# Patient Record
Sex: FEMALE | Race: WHITE | NOT HISPANIC OR LATINO | Employment: FULL TIME | URBAN - NONMETROPOLITAN AREA
[De-identification: names, ages, dates, MRNs, and addresses within clinical notes are randomized per-mention and may not be internally consistent; named-entity substitution may affect disease eponyms.]

---

## 2020-07-18 ENCOUNTER — HISTORICAL (OUTPATIENT)
Dept: ADMINISTRATIVE | Facility: HOSPITAL | Age: 40
End: 2020-07-18

## 2020-07-18 LAB
ALBUMIN SERPL BCP-MCNC: 3.9 G/DL (ref 3.5–5)
ALBUMIN/GLOB SERPL ELPH: 1 {RATIO} (ref 1.5–2.2)
ALP SERPL-CCNC: 84 U/L (ref 45–117)
ALT SERPL W P-5'-P-CCNC: 66 U/L (ref 13–56)
ANION GAP SERPL CALC-SCNC: 6.9 MEQ/L (ref 10–20)
APPEARANCE, UA: NORMAL
AST SERPL-CCNC: 41 U/L (ref 15–37)
B-HCG UR QL: NEGATIVE
BACTERIA SPEC CULT: NORMAL /HPF
BASOPHILS NFR BLD: 0.1 10 (ref 0–0.1)
BASOPHILS NFR BLD: 0.6 % (ref 0–1.5)
BILIRUB SERPL-MCNC: 0.3 MG/DL (ref 0.2–1)
BILIRUB UR QL STRIP: NEGATIVE MG/DL
BUDDING YEAST: NORMAL /HPF
BUN SERPL-MCNC: 13 MG/DL (ref 7–18)
CALCIUM SERPL-MCNC: 9.1 MG/DL (ref 8.5–10.1)
CASTS, URINE MICROSCOPIC: NEGATIVE /LPF
CHLORIDE SERPL-SCNC: 108 MMOL/L (ref 98–107)
CO2 SERPL-SCNC: 27 MMOL/L (ref 22–32)
COLOR UR: YELLOW
CREAT SERPL-MCNC: 0.63 MG/DL (ref 0.55–1.02)
EGFR: 111 ML/MIN/1.73M
EOSINOPHIL NFR BLD: 0 10 (ref 0–0.7)
EOSINOPHIL NFR BLD: 0.4 % (ref 0–7)
EPITHELIAL, URINE MICROSCOPIC: 14 /HPF
ERYTHROCYTE [DISTWIDTH] IN BLOOD BY AUTOMATED COUNT: 14.2 % (ref 11.5–14.5)
GLOBULIN: 4 G/DL (ref 2.3–3.5)
GLUCOSE (UA): NEGATIVE MG/DL
GLUCOSE SERPL-MCNC: 117 MG/DL (ref 70–99)
GRAN #: 7.65 10 (ref 2–7.5)
GRAN%: 0.3 %
GRAN%: 80.5 % (ref 50–80)
HCT VFR BLD AUTO: 39.7 % (ref 37.7–47.9)
HGB BLD-MCNC: 12.9 G/DL (ref 12.2–16.2)
HGB UR QL STRIP: NORMAL ERY/UL
IMMATURE GRANULOCYTES #: 0.03 10
INTERNAL NEG CONTROL: NEGATIVE
INTERNAL POS CONTROL: POSITIVE
KETONES UR QL STRIP: NORMAL MG/DL
LEUKOCYTE ESTERASE UR QL STRIP: NORMAL LEU/UL
LIPASE SERPL-CCNC: 111 U/L (ref 73–393)
LYMPH #: 1.2 10 (ref 1–3.5)
LYMPH%: 13 % (ref 12–50)
MCH RBC QN AUTO: 27.9 PG (ref 27–31)
MCHC RBC AUTO-ENTMCNC: 32.5 G% (ref 32–35)
MCV RBC AUTO: 85.7 FL (ref 80–97)
MONO #: 0.5 10 (ref 0–0.8)
MONO%: 5.2 % (ref 0–12)
MUCOUS, URINE MICROSCOPIC: NORMAL /HPF
NITRITE UR QL STRIP: POSITIVE MG/DL
OSMOC: 277 MOSM/KG (ref 275–295)
PH UR STRIP: 6 [PH] (ref 5–7.5)
PMV BLD AUTO: 10.8 FL (ref 7.4–10.4)
PMV BLD AUTO: 273 10 (ref 142–424)
POTASSIUM SERPL-SCNC: 3.9 MMOL/L (ref 3.5–5.1)
PROT SERPL-MCNC: 7.9 G/DL (ref 6.4–8.2)
PROT UR QL STRIP: NORMAL MG/DL
RBC # BLD AUTO: 4.63 M/UL (ref 4.04–5.48)
RBC #/AREA URNS HPF: NEGATIVE /HPF
SODIUM BLD-SCNC: 138 MMOL/L (ref 136–145)
SP GR UR STRIP: 1.03 (ref 1–1.03)
SPERM, URINE MICROSCOPIC: NORMAL /HPF
TYPE OF SPECIMEN  (UA): NORMAL
UNCLASSIFIED CRYSTALS, UA: NORMAL /HPF
UROBILINOGEN UR STRIP-ACNC: 1 EU/L
WBC # BLD AUTO: 9.5 10 (ref 4–10.2)
WBC #/AREA URNS HPF: 210 /HPF

## 2020-07-20 LAB
URINE CULTURE, ROUTINE: NORMAL

## 2020-08-27 ENCOUNTER — HOSPITAL ENCOUNTER (EMERGENCY)
Facility: HOSPITAL | Age: 40
Discharge: HOME | End: 2020-08-27
Attending: EMERGENCY MEDICINE
Payer: OTHER GOVERNMENT

## 2020-08-27 VITALS
HEIGHT: 63 IN | SYSTOLIC BLOOD PRESSURE: 109 MMHG | OXYGEN SATURATION: 100 % | HEART RATE: 67 BPM | TEMPERATURE: 100 F | WEIGHT: 90 LBS | DIASTOLIC BLOOD PRESSURE: 62 MMHG | RESPIRATION RATE: 18 BRPM | BODY MASS INDEX: 15.95 KG/M2

## 2020-08-27 DIAGNOSIS — G44.209 ACUTE NON INTRACTABLE TENSION-TYPE HEADACHE: Primary | ICD-10-CM

## 2020-08-27 DIAGNOSIS — R07.89 CHEST TIGHTNESS: ICD-10-CM

## 2020-08-27 PROCEDURE — 99284 EMERGENCY DEPT VISIT MOD MDM: CPT | Mod: 25

## 2020-08-27 PROCEDURE — 93005 ELECTROCARDIOGRAM TRACING: CPT

## 2020-08-27 PROCEDURE — 93010 EKG 12-LEAD: ICD-10-PCS | Mod: ,,, | Performed by: INTERNAL MEDICINE

## 2020-08-27 PROCEDURE — 96372 THER/PROPH/DIAG INJ SC/IM: CPT

## 2020-08-27 PROCEDURE — 93010 ELECTROCARDIOGRAM REPORT: CPT | Mod: ,,, | Performed by: INTERNAL MEDICINE

## 2020-08-27 PROCEDURE — 63600175 PHARM REV CODE 636 W HCPCS: Performed by: EMERGENCY MEDICINE

## 2020-08-27 RX ORDER — BUTALBITAL, ACETAMINOPHEN AND CAFFEINE 50; 325; 40 MG/1; MG/1; MG/1
1 TABLET ORAL EVERY 4 HOURS PRN
Qty: 16 TABLET | Refills: 0 | Status: SHIPPED | OUTPATIENT
Start: 2020-08-27 | End: 2020-08-30

## 2020-08-27 RX ORDER — KETOROLAC TROMETHAMINE 30 MG/ML
30 INJECTION, SOLUTION INTRAMUSCULAR; INTRAVENOUS
Status: COMPLETED | OUTPATIENT
Start: 2020-08-27 | End: 2020-08-27

## 2020-08-27 RX ORDER — DIPHENHYDRAMINE HYDROCHLORIDE 50 MG/ML
50 INJECTION INTRAMUSCULAR; INTRAVENOUS
Status: COMPLETED | OUTPATIENT
Start: 2020-08-27 | End: 2020-08-27

## 2020-08-27 RX ORDER — PROCHLORPERAZINE EDISYLATE 5 MG/ML
10 INJECTION INTRAMUSCULAR; INTRAVENOUS
Status: COMPLETED | OUTPATIENT
Start: 2020-08-27 | End: 2020-08-27

## 2020-08-27 RX ADMIN — PROCHLORPERAZINE EDISYLATE 10 MG: 5 INJECTION INTRAMUSCULAR; INTRAVENOUS at 10:08

## 2020-08-27 RX ADMIN — DIPHENHYDRAMINE HYDROCHLORIDE 50 MG: 50 INJECTION, SOLUTION INTRAMUSCULAR; INTRAVENOUS at 10:08

## 2020-08-27 RX ADMIN — KETOROLAC TROMETHAMINE 30 MG: 30 INJECTION, SOLUTION INTRAMUSCULAR at 10:08

## 2020-08-28 NOTE — ED NOTES
"NEUROLOGICAL:   Patient is awake , alert  and oriented x 4 . Pupils are PERRL. Gait is steady.   Moves all extremities without difficulty.   Patient reports weakness and headache.  GCS 15    HEENT:   Head appears normocephalic  and symmetric .   Eyes appear WNL to both eyes. Patient reports "weakness in eyes" to both eyes .   Ears appear WNL. Patient reports no complaints  to both ears.   Nares appear patent . Patient reports no nose complaints .  Mouth appears moist and pink. Patient reports no mouth complaints.   Throat appears pink and moist . Patient reports no throat complaints.    CARDIOVASCULAR:   S1 and S2 present, no murmurs, gallops, or rubs, rate regular  and pulses palpable (2+)    On palpation no edema noted , noted to none.   Patient reports no CV complaints.  .   Patient vitals are WNL.    RESPIRATORY:   Airway Clear, Open, and Patent.  Respirations are even and unlabored.   Breath sounds clear  to all lung fields.   Patient reports no respiratory complaints.     GASTROINTESTINAL:   Abdomen is soft  and non-tender x 4 quadrants. Bowel sounds are normoactive to all quadrants .   Patient reports nausea.   Patient reports having diarrhea last night.     GENITOURINARY:   Patient reports no  complaints.     MUSCULOSKELETAL:   full range of motion to all extremities, no swelling noted , no tenderness noted and no weakness noted.   Patient reports no musculoskeletal complaints     SKIN:   Skin appears warm , dry , good turgor, color normal for race and intact. Patient reports no skin complaints.     Patient reports "I am just not feeling myself. It started about 11am today and I just been fighting it."  "

## 2020-08-28 NOTE — ED PROVIDER NOTES
"Encounter Date: 2020       History     Chief Complaint   Patient presents with    Nausea     Pt reports "not feeling right" all day. Pt reports headache and nausea.     Headache     3-year-old female presents with a headache.  States the pain is in her forehead and constant since this morning.  She also complains of a strange feeling in her chest and nausea.  She denies shortness of breath, fever, sore throat, or anosmia.  She denies taking any over-the-counter medications for the headache today.  She states that she has had multiple headaches like this in the past        Review of patient's allergies indicates:  No Known Allergies  History reviewed. No pertinent past medical history.  Past Surgical History:   Procedure Laterality Date     SECTION       History reviewed. No pertinent family history.  Social History     Tobacco Use    Smoking status: Current Every Day Smoker     Packs/day: 1.00     Types: Cigarettes    Smokeless tobacco: Never Used   Substance Use Topics    Alcohol use: Not Currently    Drug use: Yes     Types: Marijuana     Review of Systems   Respiratory: Positive for chest tightness.    Gastrointestinal: Positive for nausea.   Neurological: Positive for headaches.   All other systems reviewed and are negative.      Physical Exam     Initial Vitals [20 2210]   BP Pulse Resp Temp SpO2   105/72 65 18 99.7 °F (37.6 °C) 99 %      MAP       --         Physical Exam    Nursing note and vitals reviewed.  Constitutional: She appears well-developed and well-nourished.   HENT:   Head: Atraumatic.   Mouth/Throat: Oropharynx is clear and moist.   Cardiovascular: Normal rate, regular rhythm and normal heart sounds.   Pulmonary/Chest: Breath sounds normal. No respiratory distress.   Abdominal: Soft. There is no abdominal tenderness.   Neurological: She is alert and oriented to person, place, and time.   Skin: Skin is warm and dry.         ED Course   Procedures  Labs Reviewed - No data " to display  EKG Readings: (Independently Interpreted)   Rhythm: Normal Sinus Rhythm. Ectopy: No Ectopy. Conduction: Normal. ST Segments: Normal ST Segments. T Waves: Normal. Clinical Impression: Normal Sinus Rhythm       Imaging Results    None          Medical Decision Making:   ED Management:  Patient feeling better after medication                                 Clinical Impression:       ICD-10-CM ICD-9-CM   1. Acute non intractable tension-type headache  G44.209 339.10   2. Chest tightness  R07.89 786.59             ED Disposition Condition    Discharge Stable        ED Prescriptions     Medication Sig Dispense Start Date End Date Auth. Provider    butalbital-acetaminophen-caffeine -40 mg (FIORICET, ESGIC) -40 mg per tablet Take 1 tablet by mouth every 4 (four) hours as needed for Pain. 16 tablet 8/27/2020 8/30/2020 Jeffery Topete MD        Follow-up Information     Follow up With Specialties Details Why Contact Info    [facility] Psychology, Internal Medicine, Gynecology, Dental General Practice In 2 days If symptoms worsen [address] [facility] [address]  [phone]                                       Jeffery Topete MD  08/27/20 3194       Jeffery Topete MD  08/27/20 1216

## 2020-09-05 ENCOUNTER — HOSPITAL ENCOUNTER (EMERGENCY)
Facility: HOSPITAL | Age: 40
Discharge: HOME | End: 2020-09-06
Attending: EMERGENCY MEDICINE
Payer: OTHER GOVERNMENT

## 2020-09-05 DIAGNOSIS — F41.9 ANXIETY: Primary | ICD-10-CM

## 2020-09-05 PROCEDURE — 99284 EMERGENCY DEPT VISIT MOD MDM: CPT | Mod: 25

## 2020-09-05 PROCEDURE — 63600175 PHARM REV CODE 636 W HCPCS: Performed by: EMERGENCY MEDICINE

## 2020-09-05 PROCEDURE — 96374 THER/PROPH/DIAG INJ IV PUSH: CPT

## 2020-09-05 RX ORDER — LORAZEPAM 2 MG/ML
1 INJECTION INTRAMUSCULAR ONCE
Status: COMPLETED | OUTPATIENT
Start: 2020-09-05 | End: 2020-09-05

## 2020-09-05 RX ADMIN — LORAZEPAM 1 MG: 2 INJECTION INTRAMUSCULAR; INTRAVENOUS at 11:09

## 2020-09-06 VITALS
SYSTOLIC BLOOD PRESSURE: 119 MMHG | RESPIRATION RATE: 16 BRPM | HEIGHT: 63 IN | WEIGHT: 86 LBS | BODY MASS INDEX: 15.24 KG/M2 | OXYGEN SATURATION: 99 % | HEART RATE: 71 BPM | TEMPERATURE: 99 F | DIASTOLIC BLOOD PRESSURE: 71 MMHG

## 2020-09-06 RX ORDER — HYDROXYZINE PAMOATE 50 MG/1
50 CAPSULE ORAL EVERY 6 HOURS PRN
Qty: 20 CAPSULE | Refills: 0 | Status: SHIPPED | OUTPATIENT
Start: 2020-09-06 | End: 2021-02-07

## 2020-09-06 NOTE — ED PROVIDER NOTES
Encounter Date: 2020       History     Chief Complaint   Patient presents with    Anxiety     Anxiety attack for a couple of days     Patient is a 39-year-old female, with past medical history of depression and anxiety, presents to the ER for anxiety and a panic attack.  She states that over the last several days she has had increased stress, because her some has been more difficult to deal with.  Should be noted that the patient some is developmentally delayed, and the mother is the primary caretaker.  Patient admits to previously being on medicines to help with depression and anxiety, and states that she has been to Behavioral Health because of previous identical episodes.  The patient denies any auditory hallucinations or visual hallucinations, suicidal, homicidal ideation.  She is tearful during my exam, discharge the she just wants to try to relax.  She denies any recent illness, fevers, chills, chest pain, shortness of breath, abdominal pain, nausea, vomiting or any other acute active symptoms    Review of patient's allergies indicates:  No Known Allergies  Past Medical History:   Diagnosis Date    Anxiety      Past Surgical History:   Procedure Laterality Date     SECTION       History reviewed. No pertinent family history.  Social History     Tobacco Use    Smoking status: Current Every Day Smoker     Packs/day: 1.00     Types: Cigarettes    Smokeless tobacco: Never Used   Substance Use Topics    Alcohol use: Not Currently    Drug use: Yes     Types: Marijuana     Review of Systems   All other systems reviewed and are negative.      Physical Exam     Initial Vitals [20 2238]   BP Pulse Resp Temp SpO2   125/79 75 18 98.9 °F (37.2 °C) 99 %      MAP       --         Physical Exam    Nursing note and vitals reviewed.  Constitutional: She appears well-developed and well-nourished.   HENT:   Head: Normocephalic and atraumatic.   Eyes: Conjunctivae and EOM are normal. Pupils are equal,  round, and reactive to light.   Neck: Normal range of motion. Neck supple. No tracheal deviation present.   Cardiovascular: Normal rate, regular rhythm, normal heart sounds and intact distal pulses.   Pulmonary/Chest: Breath sounds normal. No respiratory distress. She has no wheezes. She has no rhonchi. She has no rales. She exhibits no tenderness.   Abdominal: Soft. Bowel sounds are normal. She exhibits no distension and no mass. There is no abdominal tenderness. There is no rebound and no guarding.   Musculoskeletal: Normal range of motion. No tenderness or edema.   Neurological: She is alert and oriented to person, place, and time. She has normal strength and normal reflexes. She displays normal reflexes. No cranial nerve deficit or sensory deficit.   Skin: Skin is warm and dry. Capillary refill takes less than 2 seconds.   Psychiatric: She has a normal mood and affect. Her behavior is normal. Judgment and thought content normal.   Patient is tearful but her thought process is appropriate.         ED Course   Procedures  Labs Reviewed - No data to display       Imaging Results    None          Medical Decision Making:   ED Management:  Patient refused an EKG, chest x-ray, and lab work because of her initial complaint of chest discomfort.  She feeling stated that she was having issues with anxiety and panic, and stated that the other symptoms were simply part of her panic affected episodes.  The patient states that she is feeling much better after receiving medicine, and is being discharged home.  She was given instructions to follow-up with her primary care physician/clinic and behavior Health for further assessment evaluation.  The patient was also told to return to the ER for any acute negative worsening symptoms verbalized understanding of this Medical point.                                 Clinical Impression:       ICD-10-CM ICD-9-CM   1. Anxiety  F41.9 300.00             ED Disposition Condition     Discharge Stable        ED Prescriptions     Medication Sig Dispense Start Date End Date Auth. Provider    hydrOXYzine pamoate (VISTARIL) 50 MG Cap Take 1 capsule (50 mg total) by mouth every 6 (six) hours as needed (anxiety). 20 capsule 9/6/2020  Trenton Marte MD        Follow-up Information     Follow up With Specialties Details Why Contact Info    [facility] Psychology, Internal Medicine, Gynecology, Dental General Practice Schedule an appointment as soon as possible for a visit   [address] [place] [address]  [phone]                                       Trenton Marte MD  09/06/20 0018

## 2020-10-07 ENCOUNTER — HOSPITAL ENCOUNTER (EMERGENCY)
Facility: HOSPITAL | Age: 40
Discharge: HOME | End: 2020-10-07
Attending: EMERGENCY MEDICINE
Payer: MEDICAID

## 2020-10-07 VITALS
TEMPERATURE: 98 F | RESPIRATION RATE: 18 BRPM | OXYGEN SATURATION: 98 % | BODY MASS INDEX: 15.59 KG/M2 | WEIGHT: 88 LBS | HEART RATE: 76 BPM | HEIGHT: 63 IN | DIASTOLIC BLOOD PRESSURE: 70 MMHG | SYSTOLIC BLOOD PRESSURE: 114 MMHG

## 2020-10-07 DIAGNOSIS — R63.0 APPETITE LOSS: ICD-10-CM

## 2020-10-07 DIAGNOSIS — F19.10 SUBSTANCE ABUSE: ICD-10-CM

## 2020-10-07 DIAGNOSIS — N39.0 URINARY TRACT INFECTION WITHOUT HEMATURIA, SITE UNSPECIFIED: Primary | ICD-10-CM

## 2020-10-07 LAB
ALBUMIN SERPL BCP-MCNC: 3.4 G/DL (ref 3.5–5.2)
ALP SERPL-CCNC: 79 U/L (ref 55–135)
ALT SERPL W/O P-5'-P-CCNC: 34 U/L (ref 10–44)
AMPHET+METHAMPHET UR QL: NORMAL
ANION GAP SERPL CALC-SCNC: 4 MMOL/L (ref 8–16)
AST SERPL-CCNC: 20 U/L (ref 10–40)
B-HCG UR QL: NEGATIVE
BACTERIA #/AREA URNS HPF: ABNORMAL /HPF
BARBITURATES UR QL SCN>200 NG/ML: NEGATIVE
BASOPHILS # BLD AUTO: 0.08 K/UL (ref 0–0.2)
BASOPHILS NFR BLD: 1 % (ref 0–1.9)
BENZODIAZ UR QL SCN>200 NG/ML: NEGATIVE
BILIRUB SERPL-MCNC: 0.3 MG/DL (ref 0.1–1)
BILIRUB UR QL STRIP: NEGATIVE
BUN SERPL-MCNC: 8 MG/DL (ref 6–20)
BZE UR QL SCN: NEGATIVE
CALCIUM SERPL-MCNC: 9 MG/DL (ref 8.7–10.5)
CANNABINOIDS UR QL SCN: NORMAL
CHLORIDE SERPL-SCNC: 108 MMOL/L (ref 95–110)
CLARITY UR: ABNORMAL
CO2 SERPL-SCNC: 28 MMOL/L (ref 23–29)
COLOR UR: YELLOW
CREAT SERPL-MCNC: 0.7 MG/DL (ref 0.5–1.4)
CREAT UR-MCNC: 98.7 MG/DL (ref 15–325)
DIFFERENTIAL METHOD: NORMAL
EOSINOPHIL # BLD AUTO: 0.2 K/UL (ref 0–0.5)
EOSINOPHIL NFR BLD: 2.3 % (ref 0–8)
EPITH CASTS #/AREA URNS LPF: ABNORMAL /LPF
ERYTHROCYTE [DISTWIDTH] IN BLOOD BY AUTOMATED COUNT: 14.1 % (ref 11.5–14.5)
EST. GFR  (AFRICAN AMERICAN): >60 ML/MIN/1.73 M^2
EST. GFR  (NON AFRICAN AMERICAN): >60 ML/MIN/1.73 M^2
ETHANOL SERPL-MCNC: <3 MG/DL
GLUCOSE SERPL-MCNC: 94 MG/DL (ref 70–110)
GLUCOSE UR QL STRIP: NEGATIVE
HCT VFR BLD AUTO: 40.3 % (ref 37–48.5)
HGB BLD-MCNC: 13.3 G/DL (ref 12–16)
HGB UR QL STRIP: ABNORMAL
HYALINE CASTS #/AREA URNS LPF: 0 /LPF
IMM GRANULOCYTES # BLD AUTO: 0.01 K/UL (ref 0–0.04)
IMM GRANULOCYTES NFR BLD AUTO: 0.1 % (ref 0–0.5)
KETONES UR QL STRIP: NEGATIVE
LEUKOCYTE ESTERASE UR QL STRIP: ABNORMAL
LIPASE SERPL-CCNC: 135 U/L (ref 23–300)
LYMPHOCYTES # BLD AUTO: 1.9 K/UL (ref 1–4.8)
LYMPHOCYTES NFR BLD: 25.2 % (ref 18–48)
MCH RBC QN AUTO: 28.7 PG (ref 27–31)
MCHC RBC AUTO-ENTMCNC: 33 G/DL (ref 32–36)
MCV RBC AUTO: 87 FL (ref 82–98)
METHADONE UR QL SCN>300 NG/ML: NEGATIVE
MICROSCOPIC COMMENT: ABNORMAL
MONOCYTES # BLD AUTO: 0.7 K/UL (ref 0.3–1)
MONOCYTES NFR BLD: 8.5 % (ref 4–15)
NEUTROPHILS # BLD AUTO: 4.8 K/UL (ref 1.8–7.7)
NEUTROPHILS NFR BLD: 62.9 % (ref 38–73)
NITRITE UR QL STRIP: POSITIVE
NRBC BLD-RTO: 0 /100 WBC
OPIATES UR QL SCN: NEGATIVE
PCP UR QL SCN>25 NG/ML: NEGATIVE
PH UR STRIP: 7 [PH] (ref 5–8)
PLATELET # BLD AUTO: 256 K/UL (ref 150–350)
PMV BLD AUTO: 11.5 FL (ref 9.2–12.9)
POTASSIUM SERPL-SCNC: 4.1 MMOL/L (ref 3.5–5.1)
PROT SERPL-MCNC: 7.2 G/DL (ref 6–8.4)
PROT UR QL STRIP: NEGATIVE
RBC # BLD AUTO: 4.64 M/UL (ref 4–5.4)
RBC #/AREA URNS HPF: 1 /HPF (ref 0–4)
SARS-COV-2 RDRP RESP QL NAA+PROBE: NEGATIVE
SODIUM SERPL-SCNC: 140 MMOL/L (ref 136–145)
SP GR UR STRIP: 1.02 (ref 1–1.03)
SQUAMOUS #/AREA URNS HPF: 6 /HPF
TOXICOLOGY INFORMATION: NORMAL
URN SPEC COLLECT METH UR: ABNORMAL
UROBILINOGEN UR STRIP-ACNC: 1 EU/DL
WBC # BLD AUTO: 7.66 K/UL (ref 3.9–12.7)
WBC #/AREA URNS HPF: 87 /HPF (ref 0–5)

## 2020-10-07 PROCEDURE — 87077 CULTURE AEROBIC IDENTIFY: CPT

## 2020-10-07 PROCEDURE — 87186 SC STD MICRODIL/AGAR DIL: CPT

## 2020-10-07 PROCEDURE — 96374 THER/PROPH/DIAG INJ IV PUSH: CPT

## 2020-10-07 PROCEDURE — 83690 ASSAY OF LIPASE: CPT

## 2020-10-07 PROCEDURE — 80320 DRUG SCREEN QUANTALCOHOLS: CPT

## 2020-10-07 PROCEDURE — 96361 HYDRATE IV INFUSION ADD-ON: CPT

## 2020-10-07 PROCEDURE — 99284 EMERGENCY DEPT VISIT MOD MDM: CPT | Mod: 25

## 2020-10-07 PROCEDURE — 80053 COMPREHEN METABOLIC PANEL: CPT

## 2020-10-07 PROCEDURE — 63600175 PHARM REV CODE 636 W HCPCS: Performed by: CLINICAL NURSE SPECIALIST

## 2020-10-07 PROCEDURE — 85025 COMPLETE CBC W/AUTO DIFF WBC: CPT

## 2020-10-07 PROCEDURE — 87088 URINE BACTERIA CULTURE: CPT

## 2020-10-07 PROCEDURE — 81000 URINALYSIS NONAUTO W/SCOPE: CPT | Mod: 59

## 2020-10-07 PROCEDURE — 36415 COLL VENOUS BLD VENIPUNCTURE: CPT

## 2020-10-07 PROCEDURE — 81025 URINE PREGNANCY TEST: CPT

## 2020-10-07 PROCEDURE — 25000003 PHARM REV CODE 250: Performed by: CLINICAL NURSE SPECIALIST

## 2020-10-07 PROCEDURE — 80307 DRUG TEST PRSMV CHEM ANLYZR: CPT

## 2020-10-07 PROCEDURE — 87086 URINE CULTURE/COLONY COUNT: CPT

## 2020-10-07 PROCEDURE — U0002 COVID-19 LAB TEST NON-CDC: HCPCS

## 2020-10-07 RX ORDER — CYPROHEPTADINE HYDROCHLORIDE 4 MG/1
4 TABLET ORAL 3 TIMES DAILY PRN
Qty: 21 TABLET | Refills: 0 | Status: SHIPPED | OUTPATIENT
Start: 2020-10-07 | End: 2021-02-07

## 2020-10-07 RX ORDER — ONDANSETRON 2 MG/ML
4 INJECTION INTRAMUSCULAR; INTRAVENOUS
Status: COMPLETED | OUTPATIENT
Start: 2020-10-07 | End: 2020-10-07

## 2020-10-07 RX ORDER — NITROFURANTOIN 25; 75 MG/1; MG/1
100 CAPSULE ORAL 2 TIMES DAILY
Qty: 10 CAPSULE | Refills: 0 | Status: SHIPPED | OUTPATIENT
Start: 2020-10-07 | End: 2020-10-12

## 2020-10-07 RX ADMIN — SODIUM CHLORIDE 1000 ML: 0.9 INJECTION, SOLUTION INTRAVENOUS at 04:10

## 2020-10-07 RX ADMIN — ONDANSETRON 4 MG: 2 INJECTION INTRAMUSCULAR; INTRAVENOUS at 04:10

## 2020-10-07 NOTE — Clinical Note
"Anna "Anna" Collette was seen and treated in our emergency department on 10/7/2020.  She may return to work on 10/08/2020.       If you have any questions or concerns, please don't hesitate to call.      KYRIE Gauthier     "

## 2020-10-07 NOTE — Clinical Note
"Anna "Anna" Collette was seen and treated in our emergency department on 10/7/2020.  She may return to work on 10/09/2020.       If you have any questions or concerns, please don't hesitate to call.      KYRIE Gauthier     "

## 2020-10-07 NOTE — ED PROVIDER NOTES
"Encounter Date: 10/7/2020       History     Chief Complaint   Patient presents with    Fatigue     Patient to the ER with complaints of weakness, malaise, vomiting, body aches, and decreased urination onset yesterday    Emesis     40-YEAR-OLD FEMALE PRESENTS TO THE ER WITH WEAKNESS, MALAISE, VOMITING, BODY ACHES, DECREASED URINATION SINCE YESTERDAY.  PATIENT ALSO STATES/IF SHE WAS WEIGHING 115, NOW SHE IS DOWN TO 88 LB.  HAS NOT SEEN A DOCTOR FOR THIS ISSUE YET.  PATIENT HAS AT APPOINTMENT TOMORROW FOR LAB WORK.  DUE FOR MAMMOGRAM.  MOM HAS A HISTORY OF BREAST CANCER.  DENIES ANY CROHN'S, COLITIS, URINARY ISSUES        Review of patient's allergies indicates:  No Known Allergies  Past Medical History:   Diagnosis Date    Anxiety     Arrhythmia     "They say my heart skips a beat"     Past Surgical History:   Procedure Laterality Date     SECTION       Family History   Problem Relation Age of Onset    Cancer Mother      Social History     Tobacco Use    Smoking status: Current Every Day Smoker     Packs/day: 1.00     Types: Cigarettes    Smokeless tobacco: Never Used   Substance Use Topics    Alcohol use: Not Currently    Drug use: Yes     Types: Marijuana     Review of Systems   Constitutional: Positive for activity change, appetite change, fatigue and unexpected weight change. Negative for fever.   HENT: Negative for sore throat.    Respiratory: Negative for shortness of breath.    Cardiovascular: Negative for chest pain.   Gastrointestinal: Positive for abdominal pain, nausea and vomiting.   Genitourinary: Positive for difficulty urinating. Negative for dysuria.   Musculoskeletal: Negative for back pain.   Skin: Negative for rash.   Neurological: Negative for weakness.   Hematological: Does not bruise/bleed easily.   All other systems reviewed and are negative.      Physical Exam     Initial Vitals [10/07/20 1608]   BP Pulse Resp Temp SpO2   114/70 76 18 98.1 °F (36.7 °C) 98 %      MAP       --   "       Physical Exam    Nursing note and vitals reviewed.  Constitutional: Vital signs are normal. She appears well-developed and well-nourished. She has a sickly appearance. She appears ill.   HENT:   Head: Normocephalic and atraumatic.   Eyes: Pupils are equal, round, and reactive to light.   Neck: Normal range of motion.   Cardiovascular: Normal rate and regular rhythm.   Pulmonary/Chest: Breath sounds normal.   Abdominal: Soft. Bowel sounds are decreased. There is generalized abdominal tenderness. There is guarding.   Musculoskeletal: Normal range of motion.   Neurological: She is alert and oriented to person, place, and time.   Skin: Skin is warm and dry.   Psychiatric: Her affect is labile. She is withdrawn. Cognition and memory are normal.         ED Course   Procedures  Labs Reviewed   COMPREHENSIVE METABOLIC PANEL - Abnormal; Notable for the following components:       Result Value    Albumin 3.4 (*)     Anion Gap 4 (*)     All other components within normal limits   URINALYSIS, REFLEX TO URINE CULTURE - Abnormal; Notable for the following components:    Appearance, UA Cloudy (*)     Occult Blood UA Trace (*)     Nitrite, UA Positive (*)     Leukocytes, UA 3+ (*)     All other components within normal limits    Narrative:     Specimen Source->Urine   URINALYSIS MICROSCOPIC - Abnormal; Notable for the following components:    WBC, UA 87 (*)     Bacteria Many (*)     All other components within normal limits    Narrative:     Specimen Source->Urine   CULTURE, URINE   CBC W/ AUTO DIFFERENTIAL   LIPASE   PREGNANCY TEST, URINE RAPID    Narrative:     Specimen Source->Urine   SARS-COV-2 RNA AMPLIFICATION, QUAL   DRUG SCREEN PANEL, URINE EMERGENCY    Narrative:     Specimen Source->Urine   ALCOHOL,MEDICAL (ETHANOL)          Imaging Results          CT Abdomen Pelvis  Without Contrast (Final result)  Result time 10/07/20 17:38:43    Final result by Nghia Eckert MD (10/07/20 17:38:43)                 Impression:       No CT evidence of acute intra-abdominal pathology on this exam.  Study is somewhat limited by lack of contrast and intra-abdominal fat.      Electronically signed by: Nghia Eckert MD  Date:    10/07/2020  Time:    17:38             Narrative:    EXAMINATION:  CT ABDOMEN PELVIS WITHOUT CONTRAST    CLINICAL HISTORY:  Abdominal pain, acute, nonlocalized;WEIGHT LOSS;    TECHNIQUE:  Iterative reconstruction technique was used.    CT/cardiac nuclear exam/s in prior 12 months:  0.    COMPARISON:  None.    FINDINGS:  Detail is somewhat limited by lack of IV contrast and intra-abdominal fat.  The liver, spleen, gallbladder have an unremarkable noncontrast appearance.  The pancreas and adrenal glands are unremarkable.  No stones are seen within either kidney, and there is no hydronephrosis.  No bowel obstruction.  Pelvic viscera appear unremarkable.  No free fluid.                                 Medical Decision Making:   Clinical Tests:   Lab Tests: Ordered and Reviewed  Radiological Study: Ordered and Reviewed                             Clinical Impression:     ICD-10-CM ICD-9-CM   1. Urinary tract infection without hematuria, site unspecified  N39.0 599.0   2. Substance abuse  F19.10 305.90                          ED Disposition Condition    Discharge Stable        ED Prescriptions     Medication Sig Dispense Start Date End Date Auth. Provider    cyproheptadine (PERIACTIN) 4 mg tablet Take 1 tablet (4 mg total) by mouth 3 (three) times daily as needed. 21 tablet 10/7/2020  Mone Fraah NP        Follow-up Information    None                                      Mone Farah NP  10/07/20 1934       Mone Farah NP  10/07/20 1942

## 2020-10-10 LAB — BACTERIA UR CULT: ABNORMAL

## 2020-12-23 ENCOUNTER — HOSPITAL ENCOUNTER (EMERGENCY)
Facility: HOSPITAL | Age: 40
Discharge: HOME | End: 2020-12-23
Attending: EMERGENCY MEDICINE
Payer: OTHER GOVERNMENT

## 2020-12-23 VITALS
SYSTOLIC BLOOD PRESSURE: 91 MMHG | RESPIRATION RATE: 16 BRPM | TEMPERATURE: 98 F | DIASTOLIC BLOOD PRESSURE: 58 MMHG | OXYGEN SATURATION: 100 % | HEIGHT: 63 IN | BODY MASS INDEX: 14.18 KG/M2 | HEART RATE: 54 BPM | WEIGHT: 80 LBS

## 2020-12-23 DIAGNOSIS — R11.10 VOMITING: ICD-10-CM

## 2020-12-23 DIAGNOSIS — R11.2 NON-INTRACTABLE VOMITING WITH NAUSEA, UNSPECIFIED VOMITING TYPE: Primary | ICD-10-CM

## 2020-12-23 LAB
ALBUMIN SERPL BCP-MCNC: 3.6 G/DL (ref 3.5–5.2)
ALP SERPL-CCNC: 98 U/L (ref 55–135)
ALT SERPL W/O P-5'-P-CCNC: 90 U/L (ref 10–44)
ANION GAP SERPL CALC-SCNC: 3 MMOL/L (ref 8–16)
AST SERPL-CCNC: 25 U/L (ref 10–40)
BASOPHILS # BLD AUTO: 0.09 K/UL (ref 0–0.2)
BASOPHILS NFR BLD: 1.2 % (ref 0–1.9)
BILIRUB SERPL-MCNC: 0.5 MG/DL (ref 0.1–1)
BUN SERPL-MCNC: 11 MG/DL (ref 6–20)
CALCIUM SERPL-MCNC: 8.6 MG/DL (ref 8.7–10.5)
CHLORIDE SERPL-SCNC: 110 MMOL/L (ref 95–110)
CO2 SERPL-SCNC: 28 MMOL/L (ref 23–29)
CREAT SERPL-MCNC: 0.7 MG/DL (ref 0.5–1.4)
CTP QC/QA: YES
CTP QC/QA: YES
DIFFERENTIAL METHOD: ABNORMAL
EOSINOPHIL # BLD AUTO: 0.1 K/UL (ref 0–0.5)
EOSINOPHIL NFR BLD: 0.9 % (ref 0–8)
ERYTHROCYTE [DISTWIDTH] IN BLOOD BY AUTOMATED COUNT: 14.6 % (ref 11.5–14.5)
EST. GFR  (AFRICAN AMERICAN): >60 ML/MIN/1.73 M^2
EST. GFR  (NON AFRICAN AMERICAN): >60 ML/MIN/1.73 M^2
GLUCOSE SERPL-MCNC: 96 MG/DL (ref 70–110)
HCG INTACT+B SERPL-ACNC: <1 MIU/ML
HCT VFR BLD AUTO: 39.5 % (ref 37–48.5)
HGB BLD-MCNC: 12.7 G/DL (ref 12–16)
IMM GRANULOCYTES # BLD AUTO: 0.02 K/UL (ref 0–0.04)
IMM GRANULOCYTES NFR BLD AUTO: 0.3 % (ref 0–0.5)
LIPASE SERPL-CCNC: 100 U/L (ref 23–300)
LYMPHOCYTES # BLD AUTO: 1.3 K/UL (ref 1–4.8)
LYMPHOCYTES NFR BLD: 16.6 % (ref 18–48)
MCH RBC QN AUTO: 28.5 PG (ref 27–31)
MCHC RBC AUTO-ENTMCNC: 32.2 G/DL (ref 32–36)
MCV RBC AUTO: 89 FL (ref 82–98)
MONOCYTES # BLD AUTO: 0.5 K/UL (ref 0.3–1)
MONOCYTES NFR BLD: 6.6 % (ref 4–15)
NEUTROPHILS # BLD AUTO: 5.6 K/UL (ref 1.8–7.7)
NEUTROPHILS NFR BLD: 74.4 % (ref 38–73)
NRBC BLD-RTO: 0 /100 WBC
PLATELET # BLD AUTO: 288 K/UL (ref 150–350)
PMV BLD AUTO: 10.6 FL (ref 9.2–12.9)
POC MOLECULAR INFLUENZA A AGN: NEGATIVE
POC MOLECULAR INFLUENZA B AGN: NEGATIVE
POTASSIUM SERPL-SCNC: 4.4 MMOL/L (ref 3.5–5.1)
PROT SERPL-MCNC: 7.3 G/DL (ref 6–8.4)
RBC # BLD AUTO: 4.46 M/UL (ref 4–5.4)
SARS-COV-2 RDRP RESP QL NAA+PROBE: NEGATIVE
SODIUM SERPL-SCNC: 141 MMOL/L (ref 136–145)
WBC # BLD AUTO: 7.58 K/UL (ref 3.9–12.7)

## 2020-12-23 PROCEDURE — 25000003 PHARM REV CODE 250: Performed by: EMERGENCY MEDICINE

## 2020-12-23 PROCEDURE — 63600175 PHARM REV CODE 636 W HCPCS: Performed by: EMERGENCY MEDICINE

## 2020-12-23 PROCEDURE — 84702 CHORIONIC GONADOTROPIN TEST: CPT

## 2020-12-23 PROCEDURE — 85025 COMPLETE CBC W/AUTO DIFF WBC: CPT

## 2020-12-23 PROCEDURE — 96375 TX/PRO/DX INJ NEW DRUG ADDON: CPT

## 2020-12-23 PROCEDURE — U0002 COVID-19 LAB TEST NON-CDC: HCPCS | Performed by: EMERGENCY MEDICINE

## 2020-12-23 PROCEDURE — 36415 COLL VENOUS BLD VENIPUNCTURE: CPT

## 2020-12-23 PROCEDURE — 83690 ASSAY OF LIPASE: CPT

## 2020-12-23 PROCEDURE — 80053 COMPREHEN METABOLIC PANEL: CPT

## 2020-12-23 PROCEDURE — 99284 EMERGENCY DEPT VISIT MOD MDM: CPT | Mod: 25

## 2020-12-23 PROCEDURE — 96365 THER/PROPH/DIAG IV INF INIT: CPT

## 2020-12-23 RX ORDER — PROMETHAZINE HYDROCHLORIDE 25 MG/ML
25 INJECTION, SOLUTION INTRAMUSCULAR; INTRAVENOUS
Status: DISCONTINUED | OUTPATIENT
Start: 2020-12-23 | End: 2020-12-23

## 2020-12-23 RX ORDER — ONDANSETRON 2 MG/ML
4 INJECTION INTRAMUSCULAR; INTRAVENOUS
Status: COMPLETED | OUTPATIENT
Start: 2020-12-23 | End: 2020-12-23

## 2020-12-23 RX ORDER — MORPHINE SULFATE 4 MG/ML
4 INJECTION, SOLUTION INTRAMUSCULAR; INTRAVENOUS
Status: COMPLETED | OUTPATIENT
Start: 2020-12-23 | End: 2020-12-23

## 2020-12-23 RX ORDER — DIPHENHYDRAMINE HYDROCHLORIDE 50 MG/ML
12.5 INJECTION INTRAMUSCULAR; INTRAVENOUS
Status: COMPLETED | OUTPATIENT
Start: 2020-12-23 | End: 2020-12-23

## 2020-12-23 RX ORDER — PROMETHAZINE HYDROCHLORIDE 25 MG/1
25 SUPPOSITORY RECTAL EVERY 6 HOURS PRN
Qty: 10 SUPPOSITORY | Refills: 0 | Status: SHIPPED | OUTPATIENT
Start: 2020-12-23 | End: 2021-02-07

## 2020-12-23 RX ORDER — ONDANSETRON 4 MG/1
4 TABLET, ORALLY DISINTEGRATING ORAL EVERY 6 HOURS PRN
Qty: 12 TABLET | Refills: 0 | Status: SHIPPED | OUTPATIENT
Start: 2020-12-23 | End: 2021-02-07

## 2020-12-23 RX ORDER — METOCLOPRAMIDE HYDROCHLORIDE 5 MG/ML
5 INJECTION INTRAMUSCULAR; INTRAVENOUS
Status: COMPLETED | OUTPATIENT
Start: 2020-12-23 | End: 2020-12-23

## 2020-12-23 RX ADMIN — METOCLOPRAMIDE 5 MG: 5 INJECTION, SOLUTION INTRAMUSCULAR; INTRAVENOUS at 07:12

## 2020-12-23 RX ADMIN — DIPHENHYDRAMINE HYDROCHLORIDE 12.5 MG: 50 INJECTION, SOLUTION INTRAMUSCULAR; INTRAVENOUS at 07:12

## 2020-12-23 RX ADMIN — SODIUM CHLORIDE 1000 ML: 0.9 INJECTION, SOLUTION INTRAVENOUS at 07:12

## 2020-12-23 RX ADMIN — ONDANSETRON HYDROCHLORIDE 4 MG: 2 SOLUTION INTRAMUSCULAR; INTRAVENOUS at 07:12

## 2020-12-23 RX ADMIN — MORPHINE SULFATE 4 MG: 4 INJECTION, SOLUTION INTRAMUSCULAR; INTRAVENOUS at 07:12

## 2020-12-23 RX ADMIN — PROMETHAZINE HYDROCHLORIDE 12.5 MG: 25 INJECTION INTRAMUSCULAR; INTRAVENOUS at 07:12

## 2020-12-24 NOTE — ED PROVIDER NOTES
"Encounter Date: 2020       History     Chief Complaint   Patient presents with    Nausea     N/V since yesterday. 250 ML IV NS by EMS. Zofran IM by EMS     Emesis     The patient is a 40-year-old female, that presents to the ER via EMS for evaluation because of nausea and vomiting.  The patient states that the nausea started yesterday, and now she has been experiencing numerous episodes of non bilious nonbloody emesis throughout the day.  This point in time she states that she has been dry heaving for the last several hours.  She denies any focal anterior abdominal pain or tenderness to palpation.  She also states that she intermittently experiences episodes of vomiting like this.  She denies any chest pain or shortness of breath, fevers, chills, flank pain, dysuria, or any other acute negative symptoms        Review of patient's allergies indicates:  No Known Allergies  Past Medical History:   Diagnosis Date    Anxiety     Arrhythmia     "They say my heart skips a beat"     Past Surgical History:   Procedure Laterality Date     SECTION       Family History   Problem Relation Age of Onset    Cancer Mother      Social History     Tobacco Use    Smoking status: Current Every Day Smoker     Packs/day: 1.00     Types: Cigarettes    Smokeless tobacco: Never Used   Substance Use Topics    Alcohol use: Not Currently    Drug use: Yes     Types: Marijuana     Review of Systems   All other systems reviewed and are negative.      Physical Exam     Initial Vitals [20 1905]   BP Pulse Resp Temp SpO2   112/77 (!) 54 16 97.7 °F (36.5 °C) 100 %      MAP       --         Physical Exam    Nursing note and vitals reviewed.  Constitutional: She appears well-developed and well-nourished.   HENT:   Head: Normocephalic and atraumatic.   Eyes: Conjunctivae and EOM are normal. Pupils are equal, round, and reactive to light.   Neck: Normal range of motion. Neck supple. No tracheal deviation present. "   Cardiovascular: Normal rate, regular rhythm, normal heart sounds and intact distal pulses.   Pulmonary/Chest: Breath sounds normal. No respiratory distress. She has no wheezes. She has no rhonchi. She has no rales. She exhibits no tenderness.   Abdominal: Soft. Bowel sounds are normal. She exhibits no distension and no mass. There is no abdominal tenderness. There is no rebound and no guarding.   Musculoskeletal: Normal range of motion. No tenderness or edema.   Neurological: She is alert and oriented to person, place, and time. She has normal strength and normal reflexes. She displays normal reflexes. No cranial nerve deficit or sensory deficit.   Skin: Skin is warm and dry. Capillary refill takes less than 2 seconds.   Psychiatric: She has a normal mood and affect. Her behavior is normal. Judgment and thought content normal.         ED Course   Procedures  Labs Reviewed   CBC W/ AUTO DIFFERENTIAL - Abnormal; Notable for the following components:       Result Value    RDW 14.6 (*)     Gran % 74.4 (*)     Lymph % 16.6 (*)     All other components within normal limits   COMPREHENSIVE METABOLIC PANEL - Abnormal; Notable for the following components:    Calcium 8.6 (*)     ALT 90 (*)     Anion Gap 3 (*)     All other components within normal limits   LIPASE   HCG, QUANTITATIVE, PREGNANCY   URINALYSIS, REFLEX TO URINE CULTURE   PREGNANCY TEST, URINE RAPID   POCT INFLUENZA A/B MOLECULAR   SARS-COV-2 RDRP GENE    Narrative:     This test utilizes isothermal nucleic acid amplification   technology to detect the SARS-CoV-2 RdRp nucleic acid segment.   The analytical sensitivity (limit of detection) is 125 genome   equivalents/mL.   A POSITIVE result implies infection with the SARS-CoV-2 virus;   the patient is presumed to be contagious.     A NEGATIVE result means that SARS-CoV-2 nucleic acids are not   present above the limit of detection. A NEGATIVE result should be   treated as presumptive. It does not rule out the  possibility of   COVID-19 and should not be the sole basis for treatment decisions.   If COVID-19 is strongly suspected based on clinical and exposure   history, re-testing using an alternate molecular assay should be   considered.   This test is only for use under the Food and Drug   Administration s Emergency Use Authorization (EUA).   Commercial kits are provided by [employer].   Performance characteristics of the EUA have been independently   verified by [facility] Department of   Pathology and Laboratory Medicine.   _________________________________________________________________   The authorized Fact Sheet for Healthcare Providers and the authorized Fact   Sheet for Patients of the ID NOW COVID-19 are available on the FDA   website:     https://www.fda.gov/media/444796/download  https://www.fda.gov/media/264087/download              Imaging Results          X-Ray Chest AP Portable (In process)                  Medical Decision Making:   Differential Diagnosis:   The the patient's nausea vomiting totally resolved after meds and fluids.  Serial abdominal exams were negative for any focal tenderness palpation.  Patient's labs were all grossly within normal limits, this plan the patient states that she is feeling much better and is ready to be discharged home.  The patient was told to follow-up with her primary care physician/clinic for further assessment evaluation, and to return to the ER for any acute negative symptoms.  The patient verbalized understanding of this medical plan and agreed                             Clinical Impression:       ICD-10-CM ICD-9-CM   1. Non-intractable vomiting with nausea, unspecified vomiting type  R11.2 787.01   2. Vomiting  R11.10 787.03                          ED Disposition Condition    Discharge Stable        ED Prescriptions     Medication Sig Dispense Start Date End Date Auth. Provider    ondansetron (ZOFRAN-ODT) 4 MG TbDL Take 1 tablet (4 mg total)  by mouth every 6 (six) hours as needed (nausea). 12 tablet 12/23/2020  Trenton Marte MD    promethazine (PHENERGAN) 25 MG suppository Place 1 suppository (25 mg total) rectally every 6 (six) hours as needed for Nausea. 10 suppository 12/23/2020  Trenton Marte MD        Follow-up Information     Follow up With Specialties Details Why Contact Info    [facility] Psychology, Internal Medicine, Gynecology, Dental General Practice  As needed [address] [place] [id number]  [phone]                                         Trenton Marte MD  12/23/20 2006

## 2020-12-24 NOTE — ED NOTES

## 2020-12-24 NOTE — ED NOTES
Hourly Patient Rounds     Patient lying in bed, on back and resting. Family present  at bedside.   Patient denies complaints.   Side rails up x 2. Call light within reach.   Pt AAO x 4 and in no apparent distress.  Pain, position, and potty addressed.    Patient reports ride is on their way. Notified them to let us know when ride arrives.

## 2020-12-27 NOTE — ED NOTES
12/27/20 08:00 Patient requests reprint of AVS sheet to show proof she came to ED, states she lost her original paperwork

## 2021-02-07 ENCOUNTER — HOSPITAL ENCOUNTER (EMERGENCY)
Facility: HOSPITAL | Age: 41
Discharge: HOME | End: 2021-02-07
Attending: EMERGENCY MEDICINE
Payer: MEDICAID

## 2021-02-07 VITALS
RESPIRATION RATE: 16 BRPM | HEART RATE: 56 BPM | WEIGHT: 85.31 LBS | OXYGEN SATURATION: 99 % | HEIGHT: 63 IN | BODY MASS INDEX: 15.12 KG/M2 | TEMPERATURE: 98 F | DIASTOLIC BLOOD PRESSURE: 74 MMHG | SYSTOLIC BLOOD PRESSURE: 121 MMHG

## 2021-02-07 DIAGNOSIS — R11.2 NON-INTRACTABLE VOMITING WITH NAUSEA, UNSPECIFIED VOMITING TYPE: Primary | ICD-10-CM

## 2021-02-07 LAB
ALBUMIN SERPL BCP-MCNC: 4 G/DL (ref 3.5–5.2)
ALP SERPL-CCNC: 99 U/L (ref 55–135)
ALT SERPL W/O P-5'-P-CCNC: 112 U/L (ref 10–44)
ANION GAP SERPL CALC-SCNC: 4 MMOL/L (ref 8–16)
AST SERPL-CCNC: 85 U/L (ref 10–40)
BASOPHILS # BLD AUTO: 0.09 K/UL (ref 0–0.2)
BASOPHILS NFR BLD: 1 % (ref 0–1.9)
BILIRUB SERPL-MCNC: 0.4 MG/DL (ref 0.1–1)
BUN SERPL-MCNC: 11 MG/DL (ref 6–20)
CALCIUM SERPL-MCNC: 8.9 MG/DL (ref 8.7–10.5)
CHLORIDE SERPL-SCNC: 109 MMOL/L (ref 95–110)
CO2 SERPL-SCNC: 27 MMOL/L (ref 23–29)
CREAT SERPL-MCNC: 0.7 MG/DL (ref 0.5–1.4)
DIFFERENTIAL METHOD: ABNORMAL
EOSINOPHIL # BLD AUTO: 0 K/UL (ref 0–0.5)
EOSINOPHIL NFR BLD: 0.3 % (ref 0–8)
ERYTHROCYTE [DISTWIDTH] IN BLOOD BY AUTOMATED COUNT: 13.3 % (ref 11.5–14.5)
EST. GFR  (AFRICAN AMERICAN): >60 ML/MIN/1.73 M^2
EST. GFR  (NON AFRICAN AMERICAN): >60 ML/MIN/1.73 M^2
GLUCOSE SERPL-MCNC: 107 MG/DL (ref 70–110)
HCT VFR BLD AUTO: 43.4 % (ref 37–48.5)
HGB BLD-MCNC: 13.9 G/DL (ref 12–16)
IMM GRANULOCYTES # BLD AUTO: 0.02 K/UL (ref 0–0.04)
IMM GRANULOCYTES NFR BLD AUTO: 0.2 % (ref 0–0.5)
LYMPHOCYTES # BLD AUTO: 0.9 K/UL (ref 1–4.8)
LYMPHOCYTES NFR BLD: 9.7 % (ref 18–48)
MCH RBC QN AUTO: 28.4 PG (ref 27–31)
MCHC RBC AUTO-ENTMCNC: 32 G/DL (ref 32–36)
MCV RBC AUTO: 89 FL (ref 82–98)
MONOCYTES # BLD AUTO: 0.4 K/UL (ref 0.3–1)
MONOCYTES NFR BLD: 4 % (ref 4–15)
NEUTROPHILS # BLD AUTO: 7.6 K/UL (ref 1.8–7.7)
NEUTROPHILS NFR BLD: 84.8 % (ref 38–73)
NRBC BLD-RTO: 0 /100 WBC
PLATELET # BLD AUTO: 258 K/UL (ref 150–350)
PMV BLD AUTO: 10.9 FL (ref 9.2–12.9)
POTASSIUM SERPL-SCNC: 4.3 MMOL/L (ref 3.5–5.1)
PROT SERPL-MCNC: 8 G/DL (ref 6–8.4)
RBC # BLD AUTO: 4.9 M/UL (ref 4–5.4)
SODIUM SERPL-SCNC: 140 MMOL/L (ref 136–145)
WBC # BLD AUTO: 8.91 K/UL (ref 3.9–12.7)

## 2021-02-07 PROCEDURE — 80053 COMPREHEN METABOLIC PANEL: CPT

## 2021-02-07 PROCEDURE — 85025 COMPLETE CBC W/AUTO DIFF WBC: CPT

## 2021-02-07 PROCEDURE — 96372 THER/PROPH/DIAG INJ SC/IM: CPT | Mod: 59

## 2021-02-07 PROCEDURE — 25000003 PHARM REV CODE 250: Performed by: EMERGENCY MEDICINE

## 2021-02-07 PROCEDURE — 63600175 PHARM REV CODE 636 W HCPCS: Performed by: EMERGENCY MEDICINE

## 2021-02-07 PROCEDURE — 36415 COLL VENOUS BLD VENIPUNCTURE: CPT

## 2021-02-07 PROCEDURE — 99284 EMERGENCY DEPT VISIT MOD MDM: CPT | Mod: 25

## 2021-02-07 PROCEDURE — 96360 HYDRATION IV INFUSION INIT: CPT

## 2021-02-07 RX ORDER — PROMETHAZINE HYDROCHLORIDE 25 MG/ML
25 INJECTION, SOLUTION INTRAMUSCULAR; INTRAVENOUS
Status: COMPLETED | OUTPATIENT
Start: 2021-02-07 | End: 2021-02-07

## 2021-02-07 RX ORDER — PROMETHAZINE HYDROCHLORIDE 25 MG/1
25 TABLET ORAL EVERY 6 HOURS PRN
Qty: 15 TABLET | Refills: 0 | Status: SHIPPED | OUTPATIENT
Start: 2021-02-07 | End: 2022-01-06 | Stop reason: SDUPTHER

## 2021-02-07 RX ADMIN — SODIUM CHLORIDE 1000 ML: 0.9 INJECTION, SOLUTION INTRAVENOUS at 03:02

## 2021-02-07 RX ADMIN — PROMETHAZINE HYDROCHLORIDE 25 MG: 25 INJECTION INTRAMUSCULAR; INTRAVENOUS at 03:02

## 2021-02-13 ENCOUNTER — HOSPITAL ENCOUNTER (EMERGENCY)
Facility: HOSPITAL | Age: 41
Discharge: HOME | End: 2021-02-13
Attending: FAMILY MEDICINE
Payer: MEDICAID

## 2021-02-13 VITALS
HEART RATE: 69 BPM | OXYGEN SATURATION: 100 % | WEIGHT: 85 LBS | TEMPERATURE: 96 F | DIASTOLIC BLOOD PRESSURE: 85 MMHG | BODY MASS INDEX: 15.06 KG/M2 | RESPIRATION RATE: 16 BRPM | SYSTOLIC BLOOD PRESSURE: 128 MMHG

## 2021-02-13 DIAGNOSIS — R11.0 NAUSEA: ICD-10-CM

## 2021-02-13 DIAGNOSIS — G43.909 MIGRAINE WITHOUT STATUS MIGRAINOSUS, NOT INTRACTABLE, UNSPECIFIED MIGRAINE TYPE: Primary | ICD-10-CM

## 2021-02-13 PROCEDURE — 25000003 PHARM REV CODE 250: Performed by: FAMILY MEDICINE

## 2021-02-13 PROCEDURE — 63600175 PHARM REV CODE 636 W HCPCS: Performed by: FAMILY MEDICINE

## 2021-02-13 PROCEDURE — 96372 THER/PROPH/DIAG INJ SC/IM: CPT

## 2021-02-13 PROCEDURE — 99284 EMERGENCY DEPT VISIT MOD MDM: CPT | Mod: 25

## 2021-02-13 RX ORDER — ONDANSETRON 4 MG/1
4 TABLET, FILM COATED ORAL EVERY 12 HOURS PRN
Qty: 6 TABLET | Refills: 0 | Status: SHIPPED | OUTPATIENT
Start: 2021-02-13 | End: 2022-01-06 | Stop reason: SDUPTHER

## 2021-02-13 RX ORDER — ACETAMINOPHEN 325 MG/1
650 TABLET ORAL
Status: COMPLETED | OUTPATIENT
Start: 2021-02-13 | End: 2021-02-13

## 2021-02-13 RX ORDER — PROMETHAZINE HYDROCHLORIDE 25 MG/ML
12.5 INJECTION, SOLUTION INTRAMUSCULAR; INTRAVENOUS
Status: COMPLETED | OUTPATIENT
Start: 2021-02-13 | End: 2021-02-13

## 2021-02-13 RX ADMIN — ACETAMINOPHEN 650 MG: 325 TABLET ORAL at 04:02

## 2021-02-13 RX ADMIN — PROMETHAZINE HYDROCHLORIDE 12.5 MG: 25 INJECTION INTRAMUSCULAR; INTRAVENOUS at 04:02

## 2021-05-19 ENCOUNTER — HOSPITAL ENCOUNTER (EMERGENCY)
Facility: HOSPITAL | Age: 41
Discharge: HOME | End: 2021-05-20
Attending: EMERGENCY MEDICINE
Payer: MEDICAID

## 2021-05-19 DIAGNOSIS — G43.809 OTHER MIGRAINE WITHOUT STATUS MIGRAINOSUS, NOT INTRACTABLE: Primary | ICD-10-CM

## 2021-05-19 PROCEDURE — 25000003 PHARM REV CODE 250: Performed by: EMERGENCY MEDICINE

## 2021-05-19 PROCEDURE — 96361 HYDRATE IV INFUSION ADD-ON: CPT

## 2021-05-19 PROCEDURE — 96374 THER/PROPH/DIAG INJ IV PUSH: CPT

## 2021-05-19 PROCEDURE — 99284 EMERGENCY DEPT VISIT MOD MDM: CPT | Mod: 25

## 2021-05-19 PROCEDURE — 63600175 PHARM REV CODE 636 W HCPCS: Performed by: EMERGENCY MEDICINE

## 2021-05-19 PROCEDURE — 96375 TX/PRO/DX INJ NEW DRUG ADDON: CPT

## 2021-05-19 RX ORDER — DEXAMETHASONE SODIUM PHOSPHATE 4 MG/ML
8 INJECTION, SOLUTION INTRA-ARTICULAR; INTRALESIONAL; INTRAMUSCULAR; INTRAVENOUS; SOFT TISSUE
Status: COMPLETED | OUTPATIENT
Start: 2021-05-19 | End: 2021-05-19

## 2021-05-19 RX ORDER — DIPHENHYDRAMINE HYDROCHLORIDE 50 MG/ML
25 INJECTION INTRAMUSCULAR; INTRAVENOUS
Status: COMPLETED | OUTPATIENT
Start: 2021-05-19 | End: 2021-05-19

## 2021-05-19 RX ORDER — HYDROMORPHONE HYDROCHLORIDE 1 MG/ML
1 INJECTION, SOLUTION INTRAMUSCULAR; INTRAVENOUS; SUBCUTANEOUS
Status: COMPLETED | OUTPATIENT
Start: 2021-05-19 | End: 2021-05-19

## 2021-05-19 RX ORDER — PROCHLORPERAZINE EDISYLATE 5 MG/ML
10 INJECTION INTRAMUSCULAR; INTRAVENOUS
Status: COMPLETED | OUTPATIENT
Start: 2021-05-19 | End: 2021-05-19

## 2021-05-19 RX ADMIN — HYDROMORPHONE HYDROCHLORIDE 1 MG: 1 INJECTION, SOLUTION INTRAMUSCULAR; INTRAVENOUS; SUBCUTANEOUS at 10:05

## 2021-05-19 RX ADMIN — PROCHLORPERAZINE EDISYLATE 10 MG: 5 INJECTION INTRAMUSCULAR; INTRAVENOUS at 10:05

## 2021-05-19 RX ADMIN — DEXAMETHASONE SODIUM PHOSPHATE 8 MG: 4 INJECTION, SOLUTION INTRA-ARTICULAR; INTRALESIONAL; INTRAMUSCULAR; INTRAVENOUS; SOFT TISSUE at 10:05

## 2021-05-19 RX ADMIN — SODIUM CHLORIDE 1000 ML: 0.9 INJECTION, SOLUTION INTRAVENOUS at 10:05

## 2021-05-19 RX ADMIN — DIPHENHYDRAMINE HYDROCHLORIDE 25 MG: 50 INJECTION INTRAMUSCULAR; INTRAVENOUS at 10:05

## 2021-05-20 VITALS
RESPIRATION RATE: 18 BRPM | TEMPERATURE: 98 F | BODY MASS INDEX: 16.55 KG/M2 | WEIGHT: 93.38 LBS | DIASTOLIC BLOOD PRESSURE: 67 MMHG | SYSTOLIC BLOOD PRESSURE: 115 MMHG | HEIGHT: 63 IN | HEART RATE: 73 BPM | OXYGEN SATURATION: 99 %

## 2021-05-20 RX ORDER — PROMETHAZINE HYDROCHLORIDE 25 MG/1
25 TABLET ORAL EVERY 6 HOURS PRN
Qty: 20 TABLET | Refills: 0 | Status: SHIPPED | OUTPATIENT
Start: 2021-05-20 | End: 2022-01-06 | Stop reason: SDUPTHER

## 2021-05-20 RX ORDER — BUTALBITAL, ACETAMINOPHEN AND CAFFEINE 50; 325; 40 MG/1; MG/1; MG/1
1 TABLET ORAL EVERY 4 HOURS PRN
Qty: 20 TABLET | Refills: 0 | Status: SHIPPED | OUTPATIENT
Start: 2021-05-20 | End: 2021-06-19

## 2021-07-06 ENCOUNTER — HOSPITAL ENCOUNTER (EMERGENCY)
Facility: HOSPITAL | Age: 41
Discharge: HOME | End: 2021-07-06
Attending: EMERGENCY MEDICINE
Payer: MEDICAID

## 2021-07-06 VITALS
HEART RATE: 79 BPM | WEIGHT: 87 LBS | OXYGEN SATURATION: 98 % | RESPIRATION RATE: 18 BRPM | BODY MASS INDEX: 15.41 KG/M2 | DIASTOLIC BLOOD PRESSURE: 70 MMHG | HEIGHT: 63 IN | SYSTOLIC BLOOD PRESSURE: 118 MMHG | TEMPERATURE: 99 F

## 2021-07-06 DIAGNOSIS — G47.00 INSOMNIA, UNSPECIFIED TYPE: ICD-10-CM

## 2021-07-06 DIAGNOSIS — R23.2 HOT FLASHES: Primary | ICD-10-CM

## 2021-07-06 PROCEDURE — 99283 EMERGENCY DEPT VISIT LOW MDM: CPT

## 2021-07-06 RX ORDER — HYDROXYZINE PAMOATE 50 MG/1
50 CAPSULE ORAL NIGHTLY PRN
Qty: 10 CAPSULE | Refills: 0 | Status: SHIPPED | OUTPATIENT
Start: 2021-07-06 | End: 2023-07-08 | Stop reason: SDUPTHER

## 2021-07-13 ENCOUNTER — HOSPITAL ENCOUNTER (EMERGENCY)
Facility: HOSPITAL | Age: 41
Discharge: HOME | End: 2021-07-13
Attending: EMERGENCY MEDICINE
Payer: MEDICAID

## 2021-07-13 VITALS
HEIGHT: 63 IN | HEART RATE: 66 BPM | WEIGHT: 90 LBS | TEMPERATURE: 98 F | RESPIRATION RATE: 18 BRPM | OXYGEN SATURATION: 100 % | SYSTOLIC BLOOD PRESSURE: 116 MMHG | BODY MASS INDEX: 15.95 KG/M2 | DIASTOLIC BLOOD PRESSURE: 74 MMHG

## 2021-07-13 DIAGNOSIS — R51.9 ACUTE NONINTRACTABLE HEADACHE, UNSPECIFIED HEADACHE TYPE: Primary | ICD-10-CM

## 2021-07-13 PROCEDURE — 96372 THER/PROPH/DIAG INJ SC/IM: CPT | Mod: 59

## 2021-07-13 PROCEDURE — 63600175 PHARM REV CODE 636 W HCPCS: Performed by: EMERGENCY MEDICINE

## 2021-07-13 PROCEDURE — 99284 EMERGENCY DEPT VISIT MOD MDM: CPT

## 2021-07-13 RX ORDER — DIPHENHYDRAMINE HYDROCHLORIDE 50 MG/ML
25 INJECTION INTRAMUSCULAR; INTRAVENOUS
Status: COMPLETED | OUTPATIENT
Start: 2021-07-13 | End: 2021-07-13

## 2021-07-13 RX ORDER — PROCHLORPERAZINE EDISYLATE 5 MG/ML
10 INJECTION INTRAMUSCULAR; INTRAVENOUS
Status: COMPLETED | OUTPATIENT
Start: 2021-07-13 | End: 2021-07-13

## 2021-07-13 RX ORDER — PROMETHAZINE HYDROCHLORIDE 25 MG/1
25 TABLET ORAL EVERY 6 HOURS PRN
Qty: 15 TABLET | Refills: 0 | Status: SHIPPED | OUTPATIENT
Start: 2021-07-13 | End: 2022-01-06 | Stop reason: SDUPTHER

## 2021-07-13 RX ORDER — SUMATRIPTAN 50 MG/1
50 TABLET, FILM COATED ORAL
Qty: 12 TABLET | Refills: 0 | Status: SHIPPED | OUTPATIENT
Start: 2021-07-13

## 2021-07-13 RX ORDER — KETOROLAC TROMETHAMINE 30 MG/ML
30 INJECTION, SOLUTION INTRAMUSCULAR; INTRAVENOUS
Status: COMPLETED | OUTPATIENT
Start: 2021-07-13 | End: 2021-07-13

## 2021-07-13 RX ADMIN — KETOROLAC TROMETHAMINE 30 MG: 30 INJECTION, SOLUTION INTRAMUSCULAR at 09:07

## 2021-07-13 RX ADMIN — PROCHLORPERAZINE EDISYLATE 10 MG: 5 INJECTION INTRAMUSCULAR; INTRAVENOUS at 09:07

## 2021-07-13 RX ADMIN — DIPHENHYDRAMINE HYDROCHLORIDE 25 MG: 50 INJECTION INTRAMUSCULAR; INTRAVENOUS at 09:07

## 2021-07-24 ENCOUNTER — HOSPITAL ENCOUNTER (EMERGENCY)
Facility: HOSPITAL | Age: 41
Discharge: HOME | End: 2021-07-24
Attending: EMERGENCY MEDICINE
Payer: MEDICAID

## 2021-07-24 VITALS
OXYGEN SATURATION: 100 % | SYSTOLIC BLOOD PRESSURE: 114 MMHG | TEMPERATURE: 100 F | HEART RATE: 112 BPM | RESPIRATION RATE: 18 BRPM | DIASTOLIC BLOOD PRESSURE: 65 MMHG

## 2021-07-24 DIAGNOSIS — U07.1 COVID-19: Primary | ICD-10-CM

## 2021-07-24 DIAGNOSIS — U07.1 COVID-19 VIRUS DETECTED: ICD-10-CM

## 2021-07-24 LAB
CTP QC/QA: YES
SARS-COV-2 RDRP RESP QL NAA+PROBE: POSITIVE

## 2021-07-24 PROCEDURE — U0002 COVID-19 LAB TEST NON-CDC: HCPCS | Performed by: EMERGENCY MEDICINE

## 2021-07-24 PROCEDURE — 99284 EMERGENCY DEPT VISIT MOD MDM: CPT

## 2021-07-24 RX ORDER — BENZONATATE 100 MG/1
100 CAPSULE ORAL 3 TIMES DAILY PRN
Qty: 20 CAPSULE | Refills: 0 | Status: SHIPPED | OUTPATIENT
Start: 2021-07-24 | End: 2021-08-03

## 2021-07-24 RX ORDER — AZITHROMYCIN 250 MG/1
250 TABLET, FILM COATED ORAL DAILY
Qty: 6 TABLET | Refills: 0 | Status: SHIPPED | OUTPATIENT
Start: 2021-07-24

## 2021-07-27 ENCOUNTER — HOSPITAL ENCOUNTER (EMERGENCY)
Facility: HOSPITAL | Age: 41
Discharge: HOME | End: 2021-07-27
Attending: EMERGENCY MEDICINE
Payer: MEDICAID

## 2021-07-27 VITALS
DIASTOLIC BLOOD PRESSURE: 77 MMHG | TEMPERATURE: 99 F | SYSTOLIC BLOOD PRESSURE: 116 MMHG | WEIGHT: 89 LBS | RESPIRATION RATE: 18 BRPM | OXYGEN SATURATION: 98 % | HEIGHT: 60 IN | HEART RATE: 63 BPM | BODY MASS INDEX: 17.47 KG/M2

## 2021-07-27 DIAGNOSIS — U07.1 COVID-19: Primary | ICD-10-CM

## 2021-07-27 DIAGNOSIS — T50.905A HEMATOCHEZIA DUE TO MEDICATION: ICD-10-CM

## 2021-07-27 DIAGNOSIS — K92.1 HEMATOCHEZIA DUE TO MEDICATION: ICD-10-CM

## 2021-07-27 LAB
ALBUMIN SERPL BCP-MCNC: 3.6 G/DL (ref 3.5–5.2)
ALP SERPL-CCNC: 85 U/L (ref 55–135)
ALT SERPL W/O P-5'-P-CCNC: 44 U/L (ref 10–44)
ANION GAP SERPL CALC-SCNC: 10 MMOL/L (ref 8–16)
AST SERPL-CCNC: 31 U/L (ref 10–40)
BACTERIA #/AREA URNS HPF: NEGATIVE /HPF
BASOPHILS # BLD AUTO: 0.02 K/UL (ref 0–0.2)
BASOPHILS NFR BLD: 0.5 % (ref 0–1.9)
BILIRUB SERPL-MCNC: 0.3 MG/DL (ref 0.1–1)
BILIRUB UR QL STRIP: NEGATIVE
BUN SERPL-MCNC: 11 MG/DL (ref 6–20)
CALCIUM SERPL-MCNC: 8.4 MG/DL (ref 8.7–10.5)
CHLORIDE SERPL-SCNC: 109 MMOL/L (ref 95–110)
CLARITY UR: ABNORMAL
CO2 SERPL-SCNC: 24 MMOL/L (ref 23–29)
COLOR UR: YELLOW
CREAT SERPL-MCNC: 0.6 MG/DL (ref 0.5–1.4)
DIFFERENTIAL METHOD: NORMAL
EOSINOPHIL # BLD AUTO: 0.1 K/UL (ref 0–0.5)
EOSINOPHIL NFR BLD: 1.1 % (ref 0–8)
ERYTHROCYTE [DISTWIDTH] IN BLOOD BY AUTOMATED COUNT: 13 % (ref 11.5–14.5)
EST. GFR  (AFRICAN AMERICAN): >60 ML/MIN/1.73 M^2
EST. GFR  (NON AFRICAN AMERICAN): >60 ML/MIN/1.73 M^2
GLUCOSE SERPL-MCNC: 84 MG/DL (ref 70–110)
GLUCOSE UR QL STRIP: NEGATIVE
HCG INTACT+B SERPL-ACNC: <1 MIU/ML
HCT VFR BLD AUTO: 39.9 % (ref 37–48.5)
HGB BLD-MCNC: 13.3 G/DL (ref 12–16)
HGB UR QL STRIP: NEGATIVE
HYALINE CASTS #/AREA URNS LPF: 5 /LPF
IMM GRANULOCYTES # BLD AUTO: 0.01 K/UL (ref 0–0.04)
IMM GRANULOCYTES NFR BLD AUTO: 0.2 % (ref 0–0.5)
KETONES UR QL STRIP: NEGATIVE
LACTATE SERPL-SCNC: 0.7 MMOL/L (ref 0.5–2.2)
LEUKOCYTE ESTERASE UR QL STRIP: NEGATIVE
LIPASE SERPL-CCNC: 140 U/L (ref 23–300)
LYMPHOCYTES # BLD AUTO: 1.5 K/UL (ref 1–4.8)
LYMPHOCYTES NFR BLD: 33.2 % (ref 18–48)
MCH RBC QN AUTO: 28.3 PG (ref 27–31)
MCHC RBC AUTO-ENTMCNC: 33.3 G/DL (ref 32–36)
MCV RBC AUTO: 85 FL (ref 82–98)
MICROSCOPIC COMMENT: ABNORMAL
MONOCYTES # BLD AUTO: 0.4 K/UL (ref 0.3–1)
MONOCYTES NFR BLD: 9.8 % (ref 4–15)
NEUTROPHILS # BLD AUTO: 2.4 K/UL (ref 1.8–7.7)
NEUTROPHILS NFR BLD: 55.2 % (ref 38–73)
NITRITE UR QL STRIP: NEGATIVE
NRBC BLD-RTO: 0 /100 WBC
PH UR STRIP: 8 [PH] (ref 5–8)
PLATELET # BLD AUTO: 212 K/UL (ref 150–450)
PMV BLD AUTO: 10.8 FL (ref 9.2–12.9)
POTASSIUM SERPL-SCNC: 4 MMOL/L (ref 3.5–5.1)
PROT SERPL-MCNC: 7.7 G/DL (ref 6–8.4)
PROT UR QL STRIP: NEGATIVE
RBC # BLD AUTO: 4.7 M/UL (ref 4–5.4)
RBC #/AREA URNS HPF: 1 /HPF (ref 0–4)
SODIUM SERPL-SCNC: 143 MMOL/L (ref 136–145)
SP GR UR STRIP: 1.02 (ref 1–1.03)
SQUAMOUS #/AREA URNS HPF: 7 /HPF
URN SPEC COLLECT METH UR: ABNORMAL
UROBILINOGEN UR STRIP-ACNC: 1 EU/DL
WBC # BLD AUTO: 4.37 K/UL (ref 3.9–12.7)
WBC #/AREA URNS HPF: 1 /HPF (ref 0–5)

## 2021-07-27 PROCEDURE — 96361 HYDRATE IV INFUSION ADD-ON: CPT

## 2021-07-27 PROCEDURE — 36415 COLL VENOUS BLD VENIPUNCTURE: CPT | Performed by: EMERGENCY MEDICINE

## 2021-07-27 PROCEDURE — 96372 THER/PROPH/DIAG INJ SC/IM: CPT | Mod: 59

## 2021-07-27 PROCEDURE — 96374 THER/PROPH/DIAG INJ IV PUSH: CPT

## 2021-07-27 PROCEDURE — 25000003 PHARM REV CODE 250: Performed by: EMERGENCY MEDICINE

## 2021-07-27 PROCEDURE — 80053 COMPREHEN METABOLIC PANEL: CPT | Performed by: EMERGENCY MEDICINE

## 2021-07-27 PROCEDURE — 83690 ASSAY OF LIPASE: CPT | Performed by: EMERGENCY MEDICINE

## 2021-07-27 PROCEDURE — 99285 EMERGENCY DEPT VISIT HI MDM: CPT | Mod: 25

## 2021-07-27 PROCEDURE — 63600175 PHARM REV CODE 636 W HCPCS: Performed by: EMERGENCY MEDICINE

## 2021-07-27 PROCEDURE — 85025 COMPLETE CBC W/AUTO DIFF WBC: CPT | Performed by: EMERGENCY MEDICINE

## 2021-07-27 PROCEDURE — 83605 ASSAY OF LACTIC ACID: CPT | Performed by: EMERGENCY MEDICINE

## 2021-07-27 PROCEDURE — 81000 URINALYSIS NONAUTO W/SCOPE: CPT | Performed by: EMERGENCY MEDICINE

## 2021-07-27 PROCEDURE — 84702 CHORIONIC GONADOTROPIN TEST: CPT | Performed by: EMERGENCY MEDICINE

## 2021-07-27 PROCEDURE — 25500020 PHARM REV CODE 255: Performed by: EMERGENCY MEDICINE

## 2021-07-27 RX ORDER — PROMETHAZINE HYDROCHLORIDE 25 MG/1
25 TABLET ORAL EVERY 6 HOURS PRN
Qty: 15 TABLET | Refills: 0 | Status: SHIPPED | OUTPATIENT
Start: 2021-07-27 | End: 2022-01-06 | Stop reason: SDUPTHER

## 2021-07-27 RX ORDER — DICYCLOMINE HYDROCHLORIDE 20 MG/1
20 TABLET ORAL 2 TIMES DAILY PRN
Qty: 30 TABLET | Refills: 0 | Status: SHIPPED | OUTPATIENT
Start: 2021-07-27 | End: 2021-08-26

## 2021-07-27 RX ORDER — ONDANSETRON 2 MG/ML
4 INJECTION INTRAMUSCULAR; INTRAVENOUS
Status: COMPLETED | OUTPATIENT
Start: 2021-07-27 | End: 2021-07-27

## 2021-07-27 RX ORDER — DICYCLOMINE HYDROCHLORIDE 10 MG/ML
20 INJECTION INTRAMUSCULAR
Status: COMPLETED | OUTPATIENT
Start: 2021-07-27 | End: 2021-07-27

## 2021-07-27 RX ADMIN — SODIUM CHLORIDE 1000 ML: 900 INJECTION, SOLUTION INTRAVENOUS at 08:07

## 2021-07-27 RX ADMIN — ONDANSETRON 4 MG: 2 INJECTION INTRAMUSCULAR; INTRAVENOUS at 08:07

## 2021-07-27 RX ADMIN — IOHEXOL 100 ML: 350 INJECTION, SOLUTION INTRAVENOUS at 09:07

## 2021-07-27 RX ADMIN — DICYCLOMINE HYDROCHLORIDE 20 MG: 20 INJECTION, SOLUTION INTRAMUSCULAR at 08:07

## 2021-09-26 ENCOUNTER — HOSPITAL ENCOUNTER (EMERGENCY)
Facility: HOSPITAL | Age: 41
Discharge: HOME | End: 2021-09-26
Attending: STUDENT IN AN ORGANIZED HEALTH CARE EDUCATION/TRAINING PROGRAM
Payer: MEDICAID

## 2021-09-26 VITALS
DIASTOLIC BLOOD PRESSURE: 87 MMHG | BODY MASS INDEX: 15.06 KG/M2 | HEART RATE: 90 BPM | TEMPERATURE: 98 F | WEIGHT: 85 LBS | SYSTOLIC BLOOD PRESSURE: 120 MMHG | RESPIRATION RATE: 16 BRPM | OXYGEN SATURATION: 100 % | HEIGHT: 63 IN

## 2021-09-26 DIAGNOSIS — F41.9 ANXIETY: Primary | ICD-10-CM

## 2021-09-26 DIAGNOSIS — R06.02 SHORTNESS OF BREATH: ICD-10-CM

## 2021-09-26 LAB
ALBUMIN SERPL BCP-MCNC: 4.3 G/DL (ref 3.5–5.2)
ALP SERPL-CCNC: 76 U/L (ref 55–135)
ALT SERPL W/O P-5'-P-CCNC: 31 U/L (ref 10–44)
ANION GAP SERPL CALC-SCNC: 4 MMOL/L (ref 8–16)
AST SERPL-CCNC: 28 U/L (ref 10–40)
BASOPHILS # BLD AUTO: 0.08 K/UL (ref 0–0.2)
BASOPHILS NFR BLD: 0.9 % (ref 0–1.9)
BILIRUB SERPL-MCNC: 0.6 MG/DL (ref 0.1–1)
BUN SERPL-MCNC: 12 MG/DL (ref 6–20)
CALCIUM SERPL-MCNC: 9.3 MG/DL (ref 8.7–10.5)
CHLORIDE SERPL-SCNC: 107 MMOL/L (ref 95–110)
CO2 SERPL-SCNC: 26 MMOL/L (ref 23–29)
CREAT SERPL-MCNC: 0.8 MG/DL (ref 0.5–1.4)
CTP QC/QA: YES
DIFFERENTIAL METHOD: NORMAL
EOSINOPHIL # BLD AUTO: 0.1 K/UL (ref 0–0.5)
EOSINOPHIL NFR BLD: 0.9 % (ref 0–8)
ERYTHROCYTE [DISTWIDTH] IN BLOOD BY AUTOMATED COUNT: 13.5 % (ref 11.5–14.5)
EST. GFR  (AFRICAN AMERICAN): >60 ML/MIN/1.73 M^2
EST. GFR  (NON AFRICAN AMERICAN): >60 ML/MIN/1.73 M^2
GLUCOSE SERPL-MCNC: 126 MG/DL (ref 70–110)
HCT VFR BLD AUTO: 38.5 % (ref 37–48.5)
HGB BLD-MCNC: 13 G/DL (ref 12–16)
IMM GRANULOCYTES # BLD AUTO: 0.02 K/UL (ref 0–0.04)
IMM GRANULOCYTES NFR BLD AUTO: 0.2 % (ref 0–0.5)
LYMPHOCYTES # BLD AUTO: 1.9 K/UL (ref 1–4.8)
LYMPHOCYTES NFR BLD: 21.8 % (ref 18–48)
MCH RBC QN AUTO: 27.7 PG (ref 27–31)
MCHC RBC AUTO-ENTMCNC: 33.8 G/DL (ref 32–36)
MCV RBC AUTO: 82 FL (ref 82–98)
MONOCYTES # BLD AUTO: 0.8 K/UL (ref 0.3–1)
MONOCYTES NFR BLD: 8.6 % (ref 4–15)
NEUTROPHILS # BLD AUTO: 5.9 K/UL (ref 1.8–7.7)
NEUTROPHILS NFR BLD: 67.6 % (ref 38–73)
NRBC BLD-RTO: 0 /100 WBC
NT-PROBNP SERPL-MCNC: 63 PG/ML (ref 5–450)
PLATELET # BLD AUTO: 311 K/UL (ref 150–450)
PMV BLD AUTO: 11 FL (ref 9.2–12.9)
POTASSIUM SERPL-SCNC: 3.3 MMOL/L (ref 3.5–5.1)
PROT SERPL-MCNC: 8.5 G/DL (ref 6–8.4)
RBC # BLD AUTO: 4.69 M/UL (ref 4–5.4)
SARS-COV-2 RDRP RESP QL NAA+PROBE: NEGATIVE
SODIUM SERPL-SCNC: 137 MMOL/L (ref 136–145)
TROPONIN I SERPL DL<=0.01 NG/ML-MCNC: <0.02 NG/ML (ref 0–0.03)
WBC # BLD AUTO: 8.71 K/UL (ref 3.9–12.7)

## 2021-09-26 PROCEDURE — 84484 ASSAY OF TROPONIN QUANT: CPT | Performed by: STUDENT IN AN ORGANIZED HEALTH CARE EDUCATION/TRAINING PROGRAM

## 2021-09-26 PROCEDURE — 36415 COLL VENOUS BLD VENIPUNCTURE: CPT | Performed by: STUDENT IN AN ORGANIZED HEALTH CARE EDUCATION/TRAINING PROGRAM

## 2021-09-26 PROCEDURE — 93005 ELECTROCARDIOGRAM TRACING: CPT

## 2021-09-26 PROCEDURE — 93010 ELECTROCARDIOGRAM REPORT: CPT | Mod: ,,, | Performed by: INTERNAL MEDICINE

## 2021-09-26 PROCEDURE — 80053 COMPREHEN METABOLIC PANEL: CPT | Performed by: STUDENT IN AN ORGANIZED HEALTH CARE EDUCATION/TRAINING PROGRAM

## 2021-09-26 PROCEDURE — 99285 EMERGENCY DEPT VISIT HI MDM: CPT | Mod: 25

## 2021-09-26 PROCEDURE — 83880 ASSAY OF NATRIURETIC PEPTIDE: CPT | Performed by: STUDENT IN AN ORGANIZED HEALTH CARE EDUCATION/TRAINING PROGRAM

## 2021-09-26 PROCEDURE — 85025 COMPLETE CBC W/AUTO DIFF WBC: CPT | Performed by: STUDENT IN AN ORGANIZED HEALTH CARE EDUCATION/TRAINING PROGRAM

## 2021-09-26 PROCEDURE — 93010 EKG 12-LEAD: ICD-10-PCS | Mod: ,,, | Performed by: INTERNAL MEDICINE

## 2021-09-26 PROCEDURE — U0002 COVID-19 LAB TEST NON-CDC: HCPCS | Performed by: NURSE PRACTITIONER

## 2021-09-26 RX ORDER — ALBUTEROL SULFATE 90 UG/1
1-2 AEROSOL, METERED RESPIRATORY (INHALATION) EVERY 6 HOURS PRN
Qty: 6.7 G | Refills: 0 | Status: SHIPPED | OUTPATIENT
Start: 2021-09-26 | End: 2023-03-30 | Stop reason: SDUPTHER

## 2021-11-13 ENCOUNTER — HOSPITAL ENCOUNTER (EMERGENCY)
Facility: HOSPITAL | Age: 41
Discharge: HOME | End: 2021-11-13
Attending: EMERGENCY MEDICINE
Payer: MEDICAID

## 2021-11-13 VITALS
OXYGEN SATURATION: 98 % | BODY MASS INDEX: 15.41 KG/M2 | TEMPERATURE: 99 F | WEIGHT: 87 LBS | HEART RATE: 76 BPM | RESPIRATION RATE: 18 BRPM | SYSTOLIC BLOOD PRESSURE: 120 MMHG | DIASTOLIC BLOOD PRESSURE: 76 MMHG

## 2021-11-13 DIAGNOSIS — R11.0 NAUSEA: ICD-10-CM

## 2021-11-13 DIAGNOSIS — F32.A DEPRESSION, UNSPECIFIED DEPRESSION TYPE: Primary | ICD-10-CM

## 2021-11-13 DIAGNOSIS — F41.9 ANXIETY: ICD-10-CM

## 2021-11-13 PROCEDURE — 25000003 PHARM REV CODE 250: Performed by: EMERGENCY MEDICINE

## 2021-11-13 PROCEDURE — 99284 EMERGENCY DEPT VISIT MOD MDM: CPT | Mod: 25

## 2021-11-13 RX ORDER — DULOXETIN HYDROCHLORIDE 30 MG/1
30 CAPSULE, DELAYED RELEASE ORAL DAILY
Qty: 30 CAPSULE | Refills: 11 | Status: SHIPPED | OUTPATIENT
Start: 2021-11-13 | End: 2021-11-13

## 2021-11-13 RX ORDER — ONDANSETRON 4 MG/1
4 TABLET, ORALLY DISINTEGRATING ORAL
Status: COMPLETED | OUTPATIENT
Start: 2021-11-13 | End: 2021-11-13

## 2021-11-13 RX ORDER — ONDANSETRON 4 MG/1
4 TABLET, ORALLY DISINTEGRATING ORAL EVERY 6 HOURS PRN
Qty: 20 TABLET | Refills: 0 | Status: SHIPPED | OUTPATIENT
Start: 2021-11-13 | End: 2022-01-06 | Stop reason: SDUPTHER

## 2021-11-13 RX ORDER — DULOXETIN HYDROCHLORIDE 30 MG/1
30 CAPSULE, DELAYED RELEASE ORAL DAILY
Qty: 30 CAPSULE | Refills: 0 | Status: SHIPPED | OUTPATIENT
Start: 2021-11-13

## 2021-11-13 RX ORDER — LORAZEPAM 2 MG/ML
0.5 INJECTION INTRAMUSCULAR
Status: DISCONTINUED | OUTPATIENT
Start: 2021-11-13 | End: 2021-11-13

## 2021-11-13 RX ORDER — ONDANSETRON 2 MG/ML
4 INJECTION INTRAMUSCULAR; INTRAVENOUS
Status: DISCONTINUED | OUTPATIENT
Start: 2021-11-13 | End: 2021-11-13

## 2021-11-13 RX ORDER — LORAZEPAM 1 MG/1
1 TABLET ORAL
Status: COMPLETED | OUTPATIENT
Start: 2021-11-13 | End: 2021-11-13

## 2021-11-13 RX ADMIN — ONDANSETRON 4 MG: 4 TABLET, ORALLY DISINTEGRATING ORAL at 08:11

## 2021-11-13 RX ADMIN — LORAZEPAM 1 MG: 1 TABLET ORAL at 08:11

## 2021-11-17 ENCOUNTER — PATIENT OUTREACH (OUTPATIENT)
Dept: EMERGENCY MEDICINE | Facility: HOSPITAL | Age: 41
End: 2021-11-17
Payer: MEDICAID

## 2021-12-13 ENCOUNTER — HOSPITAL ENCOUNTER (EMERGENCY)
Facility: HOSPITAL | Age: 41
Discharge: HOME | End: 2021-12-13
Attending: STUDENT IN AN ORGANIZED HEALTH CARE EDUCATION/TRAINING PROGRAM
Payer: MEDICAID

## 2021-12-13 VITALS
SYSTOLIC BLOOD PRESSURE: 109 MMHG | TEMPERATURE: 99 F | RESPIRATION RATE: 16 BRPM | DIASTOLIC BLOOD PRESSURE: 72 MMHG | HEART RATE: 83 BPM | OXYGEN SATURATION: 98 % | WEIGHT: 85 LBS | HEIGHT: 63 IN | BODY MASS INDEX: 15.06 KG/M2

## 2021-12-13 DIAGNOSIS — M25.569 KNEE PAIN, UNSPECIFIED CHRONICITY, UNSPECIFIED LATERALITY: Primary | ICD-10-CM

## 2021-12-13 DIAGNOSIS — M25.569 KNEE PAIN: ICD-10-CM

## 2021-12-13 PROCEDURE — 99283 EMERGENCY DEPT VISIT LOW MDM: CPT | Mod: 25

## 2021-12-13 PROCEDURE — 25000003 PHARM REV CODE 250: Performed by: STUDENT IN AN ORGANIZED HEALTH CARE EDUCATION/TRAINING PROGRAM

## 2021-12-13 RX ORDER — METHOCARBAMOL 500 MG/1
500 TABLET, FILM COATED ORAL
Status: COMPLETED | OUTPATIENT
Start: 2021-12-13 | End: 2021-12-13

## 2021-12-13 RX ORDER — IBUPROFEN 600 MG/1
600 TABLET ORAL
Status: COMPLETED | OUTPATIENT
Start: 2021-12-13 | End: 2021-12-13

## 2021-12-13 RX ADMIN — IBUPROFEN 600 MG: 600 TABLET ORAL at 03:12

## 2021-12-13 RX ADMIN — METHOCARBAMOL 500 MG: 500 TABLET ORAL at 03:12

## 2022-01-06 ENCOUNTER — HOSPITAL ENCOUNTER (EMERGENCY)
Facility: HOSPITAL | Age: 42
Discharge: HOME | End: 2022-01-06
Attending: EMERGENCY MEDICINE
Payer: MEDICAID

## 2022-01-06 VITALS
OXYGEN SATURATION: 99 % | BODY MASS INDEX: 15.06 KG/M2 | SYSTOLIC BLOOD PRESSURE: 121 MMHG | DIASTOLIC BLOOD PRESSURE: 93 MMHG | TEMPERATURE: 98 F | WEIGHT: 85 LBS | HEIGHT: 63 IN | RESPIRATION RATE: 18 BRPM | HEART RATE: 69 BPM

## 2022-01-06 DIAGNOSIS — N39.0 URINARY TRACT INFECTION WITHOUT HEMATURIA, SITE UNSPECIFIED: Primary | ICD-10-CM

## 2022-01-06 LAB
B-HCG UR QL: NEGATIVE
BACTERIA #/AREA URNS HPF: ABNORMAL /HPF
BILIRUB UR QL STRIP: NEGATIVE
CLARITY UR: CLEAR
COLOR UR: YELLOW
GLUCOSE UR QL STRIP: NEGATIVE
HGB UR QL STRIP: NEGATIVE
HYALINE CASTS #/AREA URNS LPF: 4 /LPF
KETONES UR QL STRIP: NEGATIVE
LEUKOCYTE ESTERASE UR QL STRIP: ABNORMAL
MICROSCOPIC COMMENT: ABNORMAL
NITRITE UR QL STRIP: POSITIVE
PH UR STRIP: 6 [PH] (ref 5–8)
PROT UR QL STRIP: NEGATIVE
RBC #/AREA URNS HPF: 1 /HPF (ref 0–4)
SP GR UR STRIP: 1.01 (ref 1–1.03)
SQUAMOUS #/AREA URNS HPF: 1 /HPF
URN SPEC COLLECT METH UR: ABNORMAL
UROBILINOGEN UR STRIP-ACNC: NEGATIVE EU/DL
WBC #/AREA URNS HPF: 36 /HPF (ref 0–5)

## 2022-01-06 PROCEDURE — 87077 CULTURE AEROBIC IDENTIFY: CPT | Performed by: EMERGENCY MEDICINE

## 2022-01-06 PROCEDURE — 81000 URINALYSIS NONAUTO W/SCOPE: CPT | Performed by: EMERGENCY MEDICINE

## 2022-01-06 PROCEDURE — 87086 URINE CULTURE/COLONY COUNT: CPT | Performed by: EMERGENCY MEDICINE

## 2022-01-06 PROCEDURE — 99284 EMERGENCY DEPT VISIT MOD MDM: CPT | Mod: 25

## 2022-01-06 PROCEDURE — 87186 SC STD MICRODIL/AGAR DIL: CPT | Performed by: EMERGENCY MEDICINE

## 2022-01-06 PROCEDURE — 87088 URINE BACTERIA CULTURE: CPT | Performed by: EMERGENCY MEDICINE

## 2022-01-06 PROCEDURE — 81025 URINE PREGNANCY TEST: CPT | Performed by: EMERGENCY MEDICINE

## 2022-01-06 RX ORDER — CEPHALEXIN 500 MG/1
500 CAPSULE ORAL EVERY 6 HOURS
Qty: 28 CAPSULE | Refills: 0 | Status: SHIPPED | OUTPATIENT
Start: 2022-01-06 | End: 2022-01-13

## 2022-01-06 RX ORDER — CEPHALEXIN 500 MG/1
500 CAPSULE ORAL EVERY 12 HOURS
Qty: 14 CAPSULE | Refills: 0 | Status: SHIPPED | OUTPATIENT
Start: 2022-01-06 | End: 2022-01-06 | Stop reason: SDUPTHER

## 2022-01-06 RX ORDER — NAPROXEN 500 MG/1
500 TABLET ORAL EVERY 12 HOURS PRN
Qty: 20 TABLET | Refills: 0 | Status: SHIPPED | OUTPATIENT
Start: 2022-01-06 | End: 2022-02-25

## 2022-01-06 RX ORDER — ONDANSETRON 4 MG/1
4 TABLET, FILM COATED ORAL EVERY 6 HOURS PRN
Qty: 8 TABLET | Refills: 0 | Status: SHIPPED | OUTPATIENT
Start: 2022-01-06 | End: 2022-02-25

## 2022-01-06 RX ORDER — CEPHALEXIN 500 MG/1
500 CAPSULE ORAL ONCE
Qty: 1 CAPSULE | Refills: 0 | Status: SHIPPED | OUTPATIENT
Start: 2022-01-06 | End: 2022-01-06 | Stop reason: CLARIF

## 2022-01-06 NOTE — ED PROVIDER NOTES
EMERGENCY DEPARTMENT HISTORY AND PHYSICAL EXAM          Date: 1/6/2022   Patient Name: Anna H Collette       History of Presenting Illness           Chief Complaint   Patient presents with    Abdominal Pain     Right sided radiates to back, denies any injury. Nausea, food aversions. Haven't had a period in two months, not using contraception. Had tubes tied 19 years ago.          History Provided By: Patient    1646   Anna H Collette is a 41 y.o. female with PMHX of tobacco use, anxiety, migraines who presents to the emergency department C/O back and side pain.    Patient reports midthoracic back pain which radiates to right chest wall.  No shortness of breath.  Denies trauma.  Reports unintentional weight loss as well as hot flashes and missed period for 2 months.      PCP: [facility]        No current facility-administered medications for this encounter.     Current Outpatient Medications   Medication Sig Dispense Refill    albuterol (PROVENTIL/VENTOLIN HFA) 90 mcg/actuation inhaler Inhale 1-2 puffs into the lungs every 6 (six) hours as needed for Wheezing. Rescue 6.7 g 0    azithromycin (Z-TANNER) 250 MG tablet Take 1 tablet (250 mg total) by mouth once daily. Take first 2 tablets together, then 1 every day until finished. 6 tablet 0    cephALEXin (KEFLEX) 500 MG capsule Take 1 capsule (500 mg total) by mouth every 6 (six) hours. for 7 days 28 capsule 0    DULoxetine (CYMBALTA) 30 MG capsule Take 1 capsule (30 mg total) by mouth once daily. 30 capsule 0    hydrOXYzine pamoate (VISTARIL) 50 MG Cap Take 1 capsule (50 mg total) by mouth nightly as needed (As needed for sleep). 10 capsule 0    naproxen (NAPROSYN) 500 MG tablet Take 1 tablet (500 mg total) by mouth every 12 (twelve) hours as needed (Pain). 20 tablet 0    ondansetron (ZOFRAN) 4 MG tablet Take 1 tablet (4 mg total) by mouth every 6 (six) hours as needed for Nausea. 8 tablet 0    sumatriptan (IMITREX) 50 MG tablet Take 1  "tablet (50 mg total) by mouth every 2 (two) hours as needed for Migraine. 12 tablet 0           Past History     Past Medical History:   Past Medical History:   Diagnosis Date    Anxiety     Arrhythmia     "They say my heart skips a beat"    Depression         Past Surgical History:   Past Surgical History:   Procedure Laterality Date     SECTION          Family History:   Family History   Problem Relation Age of Onset    Cancer Mother         Social History:   Social History     Tobacco Use    Smoking status: Current Every Day Smoker     Packs/day: 1.00     Types: Cigarettes    Smokeless tobacco: Never Used   Substance Use Topics    Alcohol use: Not Currently    Drug use: Yes     Types: Marijuana        Allergies:   Review of patient's allergies indicates:  No Known Allergies       Review of Systems   Review of Systems   Constitutional: Negative for chills and fever.   Respiratory: Negative for chest tightness and shortness of breath.    Cardiovascular: Negative for chest pain.   Genitourinary: Positive for flank pain. Negative for decreased urine volume, difficulty urinating and dyspareunia.   Musculoskeletal: Positive for back pain.                Physical Exam     Vitals:    22 1539 22 1541   BP:  (!) 121/93   BP Location:  Right arm   Patient Position:  Sitting   Pulse:  69   Resp:  18   Temp:  97.9 °F (36.6 °C)   TempSrc:  Oral   SpO2:  99%   Weight: 38.6 kg (85 lb)    Height: 5' 3" (1.6 m)       Physical Exam  Vitals and nursing note reviewed.   Constitutional:       General: She is not in acute distress.     Appearance: Normal appearance. She is not ill-appearing.   HENT:      Head: Normocephalic and atraumatic.      Nose: Nose normal. No congestion or rhinorrhea.      Mouth/Throat:      Mouth: Mucous membranes are moist.   Eyes:      Extraocular Movements: Extraocular movements intact.      Pupils: Pupils are equal, round, and reactive to light.   Cardiovascular:      Rate and " Rhythm: Regular rhythm.   Pulmonary:      Effort: Pulmonary effort is normal. No respiratory distress.   Musculoskeletal:         General: No tenderness or deformity. Normal range of motion.      Cervical back: Normal and normal range of motion.      Thoracic back: Bony tenderness present. No spasms. Normal range of motion.      Lumbar back: Normal.        Back:    Skin:     General: Skin is warm and dry.   Neurological:      General: No focal deficit present.      Mental Status: She is alert and oriented to person, place, and time. Mental status is at baseline.   Psychiatric:         Mood and Affect: Mood normal.         Behavior: Behavior normal.              Diagnostic Study Results      Labs -   Recent Results (from the past 12 hour(s))   Urinalysis, Reflex to Urine Culture Urine, Clean Catch    Collection Time: 01/06/22  4:48 PM    Specimen: Urine, Clean Catch   Result Value Ref Range    Specimen UA Urine, Clean Catch     Color, UA Yellow Yellow, Straw, Latonya    Appearance, UA Clear Clear    pH, UA 6.0 5.0 - 8.0    Specific Gravity, UA 1.010 1.005 - 1.030    Protein, UA Negative Negative    Glucose, UA Negative Negative    Ketones, UA Negative Negative    Bilirubin (UA) Negative Negative    Occult Blood UA Negative Negative    Nitrite, UA Positive (A) Negative    Urobilinogen, UA Negative <2.0 EU/dL    Leukocytes, UA Trace (A) Negative   Pregnancy, urine rapid    Collection Time: 01/06/22  4:48 PM   Result Value Ref Range    Preg Test, Ur Negative    Urinalysis Microscopic    Collection Time: 01/06/22  4:48 PM   Result Value Ref Range    RBC, UA 1 0 - 4 /hpf    WBC, UA 36 (H) 0 - 5 /hpf    Bacteria Many (A) None-Occ /hpf    Squam Epithel, UA 1 /hpf    Hyaline Casts, UA 4 (A) 0-1/lpf /lpf    Microscopic Comment SEE COMMENT         Radiologic Studies -    No orders to display        Medications given in the ED-   Medications - No data to display        Medical Decision Making    I am the first provider for this  patient.     I reviewed the vital signs, available nursing notes, past medical history, past surgical history, family history and social history.     Vital Signs-Reviewed the patient's vital signs.     Pulse Oximetry Analysis and Interpretation:    99% on Room Air, normal      Records review: Old medical records.     Provider Notes (Medical Decision Making): Anna H Collette is a 41 y.o. female here with back and flank pain     Procedures:   Procedures      ED Course:    SMOKING CESSATION:   4:49 PM     The patient was counseled on the dangers of tobacco use, and was?advised to quit. ?Reviewed strategies to maximize success, including removing cigarettes and smoking materials from environment and written materials. Discussion took?3-5?minutes, and patient expressed understanding.         5:29 PM  With urine consistent with UTI.  Urine culture sent.  Patient is reporting back pain but is afebrile and otherwise well appearing without significant nausea or vomiting.  Will treat as pyelonephritis however patient is appropriate for discharge.  CT non-con chest ordered due to patient's focal back pain however she is declining this and just requesting to be treated for UTI      Diagnosis and Disposition     Critical Care:      DISCHARGE NOTE:       Anna H Collette's  results have been reviewed with her.  She has been counseled regarding her diagnosis, treatment, and plan.  She verbally conveys understanding and agreement of the signs, symptoms, diagnosis, treatment and prognosis and additionally agrees to follow up as discussed.  She also agrees with the care-plan and conveys that all of her questions have been answered.  I have also provided discharge instructions for her that include: educational information regarding their diagnosis and treatment, and list of reasons why they would want to return to the ED prior to their follow-up appointment, should her condition change. She has been provided with education for proper  emergency department utilization.         CLINICAL IMPRESSION:        1. Urinary tract infection without hematuria, site unspecified              PLAN:   1. Discharge Home  2.      Medication List      START taking these medications    cephALEXin 500 MG capsule  Commonly known as: KEFLEX  Take 1 capsule (500 mg total) by mouth every 6 (six) hours. for 7 days     naproxen 500 MG tablet  Commonly known as: NAPROSYN  Take 1 tablet (500 mg total) by mouth every 12 (twelve) hours as needed (Pain).     ondansetron 4 MG tablet  Commonly known as: ZOFRAN  Take 1 tablet (4 mg total) by mouth every 6 (six) hours as needed for Nausea.        ASK your doctor about these medications    albuterol 90 mcg/actuation inhaler  Commonly known as: PROVENTIL/VENTOLIN HFA  Inhale 1-2 puffs into the lungs every 6 (six) hours as needed for Wheezing. Rescue     azithromycin 250 MG tablet  Commonly known as: Z-TANNER  Take 1 tablet (250 mg total) by mouth once daily. Take first 2 tablets together, then 1 every day until finished.     DULoxetine 30 MG capsule  Commonly known as: CYMBALTA  Take 1 capsule (30 mg total) by mouth once daily.     hydrOXYzine pamoate 50 MG Cap  Commonly known as: VISTARIL  Take 1 capsule (50 mg total) by mouth nightly as needed (As needed for sleep).     sumatriptan 50 MG tablet  Commonly known as: IMITREX  Take 1 tablet (50 mg total) by mouth every 2 (two) hours as needed for Migraine.           Where to Get Your Medications      These medications were sent to [id number] - [address] [facility] & [address] [facility] [address]    Phone: [phone]   · cephALEXin 500 MG capsule  · naproxen 500 MG tablet  · ondansetron 4 MG tablet        3. [facility]  [address] [facility] [id number] [phone]    Schedule an appointment as soon as possible for a visit   Primary care follow up    [facility] Emergency Department  [address]  [facility] [id number] [phone]  Go to   If symptoms worsen       _______________________________     Please note that this dictation was completed with [employer], the computer voice recognition software.  Quite often unanticipated grammatical, syntax, homophones, and other interpretive errors are inadvertently transcribed by the computer software.  Please disregard these errors.  Please excuse any errors that have escaped final proofreading.             Abhi Cerna MD  01/06/22 4003

## 2022-01-10 LAB — BACTERIA UR CULT: ABNORMAL

## 2022-02-25 ENCOUNTER — HOSPITAL ENCOUNTER (EMERGENCY)
Facility: HOSPITAL | Age: 42
Discharge: HOME | End: 2022-02-25
Attending: EMERGENCY MEDICINE
Payer: MEDICAID

## 2022-02-25 VITALS
OXYGEN SATURATION: 100 % | SYSTOLIC BLOOD PRESSURE: 115 MMHG | RESPIRATION RATE: 18 BRPM | TEMPERATURE: 98 F | WEIGHT: 85 LBS | DIASTOLIC BLOOD PRESSURE: 70 MMHG | BODY MASS INDEX: 15.06 KG/M2 | HEART RATE: 80 BPM

## 2022-02-25 DIAGNOSIS — J06.9 VIRAL URI WITH COUGH: Primary | ICD-10-CM

## 2022-02-25 LAB
CTP QC/QA: YES
POC MOLECULAR INFLUENZA A AGN: NEGATIVE
POC MOLECULAR INFLUENZA B AGN: NEGATIVE

## 2022-02-25 PROCEDURE — 99284 EMERGENCY DEPT VISIT MOD MDM: CPT

## 2022-02-25 RX ORDER — PROMETHAZINE HYDROCHLORIDE AND DEXTROMETHORPHAN HYDROBROMIDE 6.25; 15 MG/5ML; MG/5ML
5 SYRUP ORAL EVERY 4 HOURS PRN
Qty: 118 ML | Refills: 0 | Status: SHIPPED | OUTPATIENT
Start: 2022-02-25 | End: 2022-03-07

## 2022-02-25 RX ORDER — IBUPROFEN 600 MG/1
600 TABLET ORAL EVERY 8 HOURS PRN
Qty: 15 TABLET | Refills: 0 | Status: SHIPPED | OUTPATIENT
Start: 2022-02-25 | End: 2022-03-02

## 2022-02-25 NOTE — Clinical Note
"Kelly Peck (Anna)tte was seen and treated in our emergency department on 2/25/2022.  She may return to work on 02/27/2022.       If you have any questions or concerns, please don't hesitate to call.      Mara Hamilton NP"

## 2022-03-02 ENCOUNTER — HOSPITAL ENCOUNTER (EMERGENCY)
Facility: HOSPITAL | Age: 42
Discharge: HOME | End: 2022-03-02
Attending: STUDENT IN AN ORGANIZED HEALTH CARE EDUCATION/TRAINING PROGRAM
Payer: MEDICAID

## 2022-03-02 VITALS
TEMPERATURE: 98 F | DIASTOLIC BLOOD PRESSURE: 80 MMHG | SYSTOLIC BLOOD PRESSURE: 139 MMHG | OXYGEN SATURATION: 100 % | BODY MASS INDEX: 14.53 KG/M2 | HEIGHT: 63 IN | HEART RATE: 50 BPM | WEIGHT: 82 LBS | RESPIRATION RATE: 16 BRPM

## 2022-03-02 DIAGNOSIS — R11.10 VOMITING, INTRACTABILITY OF VOMITING NOT SPECIFIED, PRESENCE OF NAUSEA NOT SPECIFIED, UNSPECIFIED VOMITING TYPE: Primary | ICD-10-CM

## 2022-03-02 PROCEDURE — 96372 THER/PROPH/DIAG INJ SC/IM: CPT

## 2022-03-02 PROCEDURE — 99284 EMERGENCY DEPT VISIT MOD MDM: CPT | Mod: 25

## 2022-03-02 PROCEDURE — 63600175 PHARM REV CODE 636 W HCPCS: Performed by: STUDENT IN AN ORGANIZED HEALTH CARE EDUCATION/TRAINING PROGRAM

## 2022-03-02 RX ORDER — METOCLOPRAMIDE HYDROCHLORIDE 5 MG/ML
10 INJECTION INTRAMUSCULAR; INTRAVENOUS
Status: COMPLETED | OUTPATIENT
Start: 2022-03-02 | End: 2022-03-02

## 2022-03-02 RX ORDER — ONDANSETRON 4 MG/1
4 TABLET, FILM COATED ORAL EVERY 6 HOURS
Qty: 30 TABLET | Refills: 0 | Status: SHIPPED | OUTPATIENT
Start: 2022-03-02 | End: 2023-07-12 | Stop reason: SDUPTHER

## 2022-03-02 RX ADMIN — METOCLOPRAMIDE 10 MG: 5 INJECTION, SOLUTION INTRAMUSCULAR; INTRAVENOUS at 10:03

## 2022-03-02 NOTE — ED PROVIDER NOTES
"Encounter Date: 3/2/2022       History     Chief Complaint   Patient presents with    Vomiting     Pt stated that she began vomiting last night. Been experiencing runny nose/cough for the past week.      42 y/o male presents with multiple episode of nbnb vomiting since last night. Has been having uri symptoms for 1 week. Denies any abdominal pain, diarrhea, flank pain, dysuria, fever        Review of patient's allergies indicates:  No Known Allergies  Past Medical History:   Diagnosis Date    Anxiety     Arrhythmia     "They say my heart skips a beat"    Depression      Past Surgical History:   Procedure Laterality Date     SECTION       Family History   Problem Relation Age of Onset    Cancer Mother      Social History     Tobacco Use    Smoking status: Current Every Day Smoker     Packs/day: 1.00     Types: Cigarettes    Smokeless tobacco: Never Used   Substance Use Topics    Alcohol use: Not Currently    Drug use: Yes     Types: Marijuana     Review of Systems   Constitutional: Negative.    HENT: Negative.    Respiratory: Negative.    Cardiovascular: Negative.    Gastrointestinal: Positive for nausea and vomiting.   Genitourinary: Negative.    Musculoskeletal: Negative.    Skin: Negative.    Neurological: Negative.    Psychiatric/Behavioral: Negative.    All other systems reviewed and are negative.      Physical Exam     Initial Vitals [22 1015]   BP Pulse Resp Temp SpO2   (!) 144/95 (!) 48 14 -- 100 %      MAP       --         Physical Exam    Nursing note and vitals reviewed.  Constitutional: Vital signs are normal. She appears well-developed and well-nourished.   HENT:   Head: Normocephalic and atraumatic.   Eyes: Conjunctivae and lids are normal.   Neck: Trachea normal. Neck supple.   Cardiovascular: Normal rate, regular rhythm, normal heart sounds, intact distal pulses and normal pulses.   No murmur heard.  Pulmonary/Chest: Breath sounds normal. No respiratory distress.   Abdominal: " Abdomen is soft. Bowel sounds are normal. She exhibits no distension. There is no abdominal tenderness. There is no rebound and no guarding.   Musculoskeletal:         General: Normal range of motion.      Cervical back: Neck supple.     Neurological: She is alert and oriented to person, place, and time. She has normal strength. No cranial nerve deficit or sensory deficit.   Skin: Skin is warm. Capillary refill takes less than 2 seconds.   Psychiatric: She has a normal mood and affect. Her speech is normal. Thought content normal.         ED Course   Procedures  Labs Reviewed - No data to display       Imaging Results    None          Medications   metoclopramide HCl injection 10 mg (10 mg Intramuscular Given 3/2/22 1027)     Medical Decision Making:   Initial Assessment:   42 y/o male presents with multiple episode of nbnb vomiting since last night. Afebrile and vitals stable. Abdominal pain nontender to palpation. Antiemetic. reeval              ED Course as of 03/02/22 1045   Wed Mar 02, 2022   1044 Patient feels better.  Abdomen remained soft and non peritonitic.  Patient said that she has episodes of vomiting and abdominal pain and follows a GI doctor with follow-up on Friday.  Patient tolerating p.o..  Will discharge patient home with Zofran.  Patient said that she normally takes Zofran for ran out [HD]      ED Course User Index  [HD] Christiano Fox MD             Clinical Impression:   Final diagnoses:  [R11.10] Vomiting, intractability of vomiting not specified, presence of nausea not specified, unspecified vomiting type (Primary)          ED Disposition Condition    Discharge Stable        ED Prescriptions     Medication Sig Dispense Start Date End Date Auth. Provider    ondansetron (ZOFRAN) 4 MG tablet Take 1 tablet (4 mg total) by mouth every 6 (six) hours. 30 tablet 3/2/2022  Christiano Fox MD        Follow-up Information     Follow up With Specialties Details Why Contact [facility]  [place] [facility], Internal Medicine, Gynecology, Dental General Practice Today As needed, If symptoms worsen [address] [facility] [address]  [phone]             Christiano Fox MD  03/02/22 1046

## 2022-03-02 NOTE — Clinical Note
"Kelly Christensen" Collette was seen and treated in our emergency department on 3/2/2022.  She may return to work on 03/04/2022.       If you have any questions or concerns, please don't hesitate to call.      Christiano Fox MD"

## 2022-03-07 DIAGNOSIS — R22.1 LOCALIZED SWELLING, MASS AND LUMP, NECK: Primary | ICD-10-CM

## 2022-05-04 NOTE — ED PROVIDER NOTES
"Encounter Date: 2022       History     Chief Complaint   Patient presents with    Fatigue     Patient states that since yesterday she has not been feeling bad, body aches, HA     This is a 41-year-old white female with noncontributory past medical history, smoker, who presents to the emergency department multiple complaints.  Patient relates that since yesterday she has been experiencing generalized fatigue associated with body aches, headache, stuffy/runny nose, and nonproductive cough.  Patient denies chest pain, shortness of breath, vomiting, or diarrhea.  Patient's grandson tested positive for influenza today.        Review of patient's allergies indicates:  No Known Allergies  Past Medical History:   Diagnosis Date    Anxiety     Arrhythmia     "They say my heart skips a beat"    Depression      Past Surgical History:   Procedure Laterality Date     SECTION       Family History   Problem Relation Age of Onset    Cancer Mother      Social History     Tobacco Use    Smoking status: Current Every Day Smoker     Packs/day: 1.00     Types: Cigarettes    Smokeless tobacco: Never Used   Substance Use Topics    Alcohol use: Not Currently    Drug use: Yes     Types: Marijuana     Review of Systems   Constitutional: Positive for fatigue.   HENT: Positive for rhinorrhea and sore throat.    Respiratory: Positive for cough. Negative for shortness of breath.    Cardiovascular: Negative.    Gastrointestinal: Negative.    Musculoskeletal: Negative.    Neurological: Positive for headaches. Negative for dizziness and weakness.       Physical Exam     Initial Vitals [22 1650]   BP Pulse Resp Temp SpO2   115/70 80 18 98.4 °F (36.9 °C) 100 %      MAP       --         Physical Exam    Nursing note and vitals reviewed.  Constitutional: She appears well-developed and well-nourished. She is active. No distress.   HENT:   Head: Normocephalic and atraumatic.   Mouth/Throat: Oropharynx is clear and moist. No " Progress Note    Patient: Stephanie Wade  Date: 2022    :     1995  Start: 1:00      Stop: 1:30     Patient's identity was verified by name and birth date.  Session was conducted on the phone with patient permission due to COVID distancing measures.  Patient phone session was conducted in the patient's workplace.  I was located in my home office.     Diagnosis F33.1 Major Depression/recurrent/moderate               10 minutes spent on supportive therapy and CBT to process patient's psychosocial stressors and coping skills to manage symptoms of mood lability.    CHIEF COMPLAINT:   \"I still feel a bit more manic.\"    HISTORY OF PRESENTING ILLNESS:    Medications:    #1Latuda 20 mg 2 weeks ago: after two weeks she felt stable and then started to feel unstable again    #2: Trintellix 20 mg: side effects: no side effects  #3 Klonopin .5 mg: side effects: none.  Took this 3 times because she has felt manci  #4 Seroquel 50 XR mg when she is about to go to sleep and 50 mg instant at 8:30 pm:  This is helping at night and she can get good sleep.     Patient reports the following problems or symptoms:   Problem #1 Mood: felt good in [place] and as the week progressed after [place] her thoughts were racing that were out of control, over thinking things, overly proud of things    Problem #2 Work: going well    Patient  denies SI, HI, self harm.  Patient is adherent with medications and follow-up.        MEDICAL HISTORY    Past Medical History:   Diagnosis Date   • Anxiety    • Depressive disorder    • Migraines        ALLERGIES    ALLERGIES:  No Known Allergies    CURRENT MEDICATIONS   Current Outpatient Medications   Medication Sig Dispense Refill   • norethindrone-ethinyl estradiol (Nortrel 1/35, 28,) 1-35 MG-MCG per tablet Take 1 tablet by mouth daily. 84 tablet 3   • lurasidone (LATUDA) 20 MG tablet Take at bedtime with 300 calories. 30 tablet 0   • cloNIDine (CATAPRES) 0.1 MG tablet Take 1 tablet by mouth daily.  30 tablet 0   • [START ON 5/9/2022] Vortioxetine HBr (Trintellix) 20 MG Tab Take 1 tablet by mouth daily. Do not start before May 9, 2022. 90 tablet 3   • QUEtiapine (SEROquel) 50 MG tablet Take 1 tablet by mouth at bedtime. 90 tablet 3   • QUEtiapine (SEROquel XR) 50 MG 24 hr tablet Take 1 tablet by mouth at bedtime. 90 tablet 3   • clonazePAM (KlonoPIN) 0.5 MG tablet Take ond a day PRN for level 3-4 anxiety. 15 tablet 0   • SUMAtriptan (IMITREX) 50 MG tablet Take 1 tablet by mouth as needed for Migraine. Take 1 tablet by mouth at onset of migraine. May repeat after 2 hours if needed. 10 tablet 5     No current facility-administered medications for this visit.       PAST MEDICAL HISTORY  Past Medical History:   Diagnosis Date   • Anxiety    • Depressive disorder    • Migraines        FAMILY HISTORY    Family History   Problem Relation Age of Onset   • Cancer, Skin Melanoma Mother    • Depression Sister         SOCIAL HISTORY  Nutrition: Buying more fruits and vegies and taking good supplements   Exercise: every day.  Spiritual Practice: walks in nature: avoiding delusions by not reading the bible.  Social Connectedness: helping a friend with a wedding and a friend with heart issues who is now engaged.   Sleep: sleep is helping with the seroquel because it calms her thoughts.  Screen Time: minimal: more active.    REVIEW OF SYSTEM  Review of Systems    VITAL SIGNS  LMP 06/21/2021 (Approximate)     MENTAL STATUS EXAM:    Due to Phone visit, the following was not observed: hygiene, appearence, distress, gait, posture, eye contact, or mannerisms.    Hygiene Concerns:  [x]  No      []  Yes   Describe:  Appearance:   [x]  Unremarkable    []  Other  Distress:  [x]  None     []  Mild   []  Moderate    []  Acute  Gait:   [x]  Normal     []  Slow/Retarded  []  Hyper   Posture:  [x]  Normal     []  Slumped  []  Rigid  Eye Contact: [x]  Appropriate     Avoided [] [place] intense    Mannerisms:   [x]  Unremarkable     []  oropharyngeal exudate.   Eyes: EOM are normal. Pupils are equal, round, and reactive to light.   Neck: Neck supple.   Normal range of motion.  Cardiovascular: Normal rate, regular rhythm and normal heart sounds.   Pulmonary/Chest: Breath sounds normal. No respiratory distress.   Musculoskeletal:         General: Normal range of motion.      Cervical back: Normal range of motion and neck supple.     Neurological: She is alert and oriented to person, place, and time. GCS score is 15. GCS eye subscore is 4. GCS verbal subscore is 5. GCS motor subscore is 6.   Skin: Skin is warm and dry. Capillary refill takes less than 2 seconds.   Psychiatric: She has a normal mood and affect. Her behavior is normal. Thought content normal.         ED Course   Procedures  Labs Reviewed   POCT INFLUENZA A/B MOLECULAR          Imaging Results    None          Medications - No data to display              ED Course as of 02/25/22 1654   Fri Feb 25, 2022 1650 Influenza test negative [CB]      ED Course User Index  [CB] Mara Hamilton NP             Clinical Impression:   Final diagnoses:  [J06.9] Viral URI with cough (Primary)          ED Disposition Condition    Discharge Stable        ED Prescriptions     Medication Sig Dispense Start Date End Date Auth. Provider    promethazine-dextromethorphan (PROMETHAZINE-DM) 6.25-15 mg/5 mL Syrp Take 5 mLs by mouth every 4 (four) hours as needed. 118 mL 2/25/2022 3/7/2022 Mara Hamilton NP    ibuprofen (ADVIL,MOTRIN) 600 MG tablet Take 1 tablet (600 mg total) by mouth every 8 (eight) hours as needed for Pain. 15 tablet 2/25/2022 3/2/2022 Mara Hamilton NP        Follow-up Information     Follow up With Specialties Details Why Contact Info    [facility] Psychology, Internal Medicine, Gynecology, Dental General Practice Schedule an appointment as soon as possible for a visit in 2 days for re-evaluation of today's complaint [address] [facility] [place]  [address]  [phone]             Mara Hamilton, MIGUEL ANGEL  02/25/22 1651       Mara Hamilton, MIGUEL ANGEL  02/25/22 1654     Gestures [] Grimaces  [] Twitches/Tics         []  Tremor  []  Other  Behavior:  [x]  Normal     []  Restless  []  Compulsive  []  Tremulous         []  Lethargic    Mood/Feelings: []  Euthymic    []  Irritable  []  Anxious  []  Depressed          []  Euphoric         [x]  Labile  []  Grief  []  Paranoia   []  Panic           []  Apathy/indifference   []  Helpless          []  Hopeless         []  Other  Affect:  [x]  Normal     []  Constricted  [] Flat  []  Blunted     [x] Appropriate to Content   []Inappropriate to Content  Thought Processes: [x]  Congruent    []  Incongruent  [] Loose Associations         []  Poverty of Ideas  []  Tangential    []  Incoherence         []  Blocking/thought interruption  Thought Content: [x]  Normal     []  Delusions  []  Obsessions  []  Phobias         []  Hypochondria  []  Sexual Preoccupation  Perceptual Problems: [x]  None    [] Hallucinations  []  Auditory  [] Visual   Orientation:  [x]  Normal/No Impairment  []  Person  []  Place  []  Time  Speech:  [x]  Clear/Articulate    []  Soft  []  Loud  []  Pressured  []  Slurred  Insight:  [x]  Good     []  Fair  []  Poor  Judgement:  [x]  Good     []  Fair  []  Poor  Recent Memory: [x]  Good     []  Fair  []  Poor  Remote Memory: [x]  Good     []  Fair  []  Poor  Concentration: [x]  Good     []  Fair  []  Poor  Attention:  [x]  Good     []  Fair  []  Poor  Level of Engagement:   [x]  Open    []  Guarded   []  Resistant    Risk assessment: Patient denies suicidal or homicidal ideation, plan, intent, desire for self-injury, or preoccupation with violence.     ASSESSMENT & INTERVENTIONS    Medication Discussion: Mood is labile, increase latuda to 40 mg.  Current Assessment: Discussed her feelings of hypomania.  Feels work is going well.  Patient feels that she is \"hyped up\" today   Interventions: Coping skills, medication management and supportive therapy.    FOLLOW-UP/ONGOING MANAGEMENT  Return to office in 2 weeks as discussed with  patient who verbalized understanding of and agreement with the plan.    Patient in [facility] with [facility]    Safety  Patient was advised to call 911 or to go to the nearest Emergency Department for acute or emergency situations. Patient verbalized understanding & agreement with the plan  Medications  Proper usage and side effects of medications reviewed and discussed. Alternative treatment options discussed. Patient verbalized informed consent, understanding, and agreement with plan of care.   [facility] Database was not checked on 05/04/22 prior to prescribing controlled substances and no suspected fraudulent activity was found.        DISCUSSION/SUMMARY:  Patient will contact the office should questions/concerns arise, and/or if symptoms change/develop/worsen. Patient education completed on disease process, etiology, and prognosis. Patient verbalized informed consent for continued treatment.      Imani WHITMAN PhD  5/4/2022

## 2022-07-02 ENCOUNTER — HOSPITAL ENCOUNTER (EMERGENCY)
Facility: HOSPITAL | Age: 42
Discharge: HOME | End: 2022-07-02
Attending: EMERGENCY MEDICINE
Payer: MEDICAID

## 2022-07-02 VITALS
RESPIRATION RATE: 18 BRPM | OXYGEN SATURATION: 99 % | SYSTOLIC BLOOD PRESSURE: 129 MMHG | HEART RATE: 74 BPM | TEMPERATURE: 99 F | DIASTOLIC BLOOD PRESSURE: 72 MMHG | HEIGHT: 63 IN | BODY MASS INDEX: 14.18 KG/M2 | WEIGHT: 80 LBS

## 2022-07-02 DIAGNOSIS — R11.2 NON-INTRACTABLE VOMITING WITH NAUSEA, UNSPECIFIED VOMITING TYPE: Primary | ICD-10-CM

## 2022-07-02 DIAGNOSIS — F12.90 MARIJUANA USE: ICD-10-CM

## 2022-07-02 DIAGNOSIS — N30.00 ACUTE CYSTITIS WITHOUT HEMATURIA: ICD-10-CM

## 2022-07-02 LAB
ALBUMIN SERPL BCP-MCNC: 4.1 G/DL (ref 3.5–5.2)
ALP SERPL-CCNC: 94 U/L (ref 55–135)
ALT SERPL W/O P-5'-P-CCNC: 46 U/L (ref 10–44)
ANION GAP SERPL CALC-SCNC: 7 MMOL/L (ref 8–16)
AST SERPL-CCNC: 25 U/L (ref 10–40)
B-HCG UR QL: NEGATIVE
BACTERIA #/AREA URNS HPF: ABNORMAL /HPF
BASOPHILS # BLD AUTO: 0.06 K/UL (ref 0–0.2)
BASOPHILS NFR BLD: 0.6 % (ref 0–1.9)
BILIRUB SERPL-MCNC: 0.4 MG/DL (ref 0.1–1)
BILIRUB UR QL STRIP: NEGATIVE
BUN SERPL-MCNC: 11 MG/DL (ref 6–20)
CALCIUM SERPL-MCNC: 8.6 MG/DL (ref 8.7–10.5)
CHLORIDE SERPL-SCNC: 108 MMOL/L (ref 95–110)
CLARITY UR: CLEAR
CO2 SERPL-SCNC: 24 MMOL/L (ref 23–29)
COLOR UR: YELLOW
CREAT SERPL-MCNC: 0.6 MG/DL (ref 0.5–1.4)
DIFFERENTIAL METHOD: ABNORMAL
EOSINOPHIL # BLD AUTO: 0.1 K/UL (ref 0–0.5)
EOSINOPHIL NFR BLD: 1 % (ref 0–8)
ERYTHROCYTE [DISTWIDTH] IN BLOOD BY AUTOMATED COUNT: 13.3 % (ref 11.5–14.5)
EST. GFR  (AFRICAN AMERICAN): >60 ML/MIN/1.73 M^2
EST. GFR  (NON AFRICAN AMERICAN): >60 ML/MIN/1.73 M^2
GLUCOSE SERPL-MCNC: 128 MG/DL (ref 70–110)
GLUCOSE UR QL STRIP: NEGATIVE
HCT VFR BLD AUTO: 40.4 % (ref 37–48.5)
HGB BLD-MCNC: 13.7 G/DL (ref 12–16)
HGB UR QL STRIP: NEGATIVE
HYALINE CASTS #/AREA URNS LPF: 9 /LPF
IMM GRANULOCYTES # BLD AUTO: 0.03 K/UL (ref 0–0.04)
IMM GRANULOCYTES NFR BLD AUTO: 0.3 % (ref 0–0.5)
KETONES UR QL STRIP: ABNORMAL
LEUKOCYTE ESTERASE UR QL STRIP: NEGATIVE
LIPASE SERPL-CCNC: 94 U/L (ref 23–300)
LYMPHOCYTES # BLD AUTO: 0.3 K/UL (ref 1–4.8)
LYMPHOCYTES NFR BLD: 2.4 % (ref 18–48)
MCH RBC QN AUTO: 29.3 PG (ref 27–31)
MCHC RBC AUTO-ENTMCNC: 33.9 G/DL (ref 32–36)
MCV RBC AUTO: 86 FL (ref 82–98)
MICROSCOPIC COMMENT: ABNORMAL
MONOCYTES # BLD AUTO: 0.8 K/UL (ref 0.3–1)
MONOCYTES NFR BLD: 7.2 % (ref 4–15)
NEUTROPHILS # BLD AUTO: 9.2 K/UL (ref 1.8–7.7)
NEUTROPHILS NFR BLD: 88.5 % (ref 38–73)
NITRITE UR QL STRIP: POSITIVE
NRBC BLD-RTO: 0 /100 WBC
PH UR STRIP: 6 [PH] (ref 5–8)
PLATELET # BLD AUTO: 214 K/UL (ref 150–450)
PMV BLD AUTO: 11.7 FL (ref 9.2–12.9)
POTASSIUM SERPL-SCNC: 3.7 MMOL/L (ref 3.5–5.1)
PROT SERPL-MCNC: 7.9 G/DL (ref 6–8.4)
PROT UR QL STRIP: ABNORMAL
RBC # BLD AUTO: 4.68 M/UL (ref 4–5.4)
RBC #/AREA URNS HPF: 2 /HPF (ref 0–4)
SODIUM SERPL-SCNC: 139 MMOL/L (ref 136–145)
SP GR UR STRIP: 1.02 (ref 1–1.03)
SQUAMOUS #/AREA URNS HPF: 5 /HPF
URN SPEC COLLECT METH UR: ABNORMAL
UROBILINOGEN UR STRIP-ACNC: 1 EU/DL
WBC # BLD AUTO: 10.41 K/UL (ref 3.9–12.7)
WBC #/AREA URNS HPF: 9 /HPF (ref 0–5)

## 2022-07-02 PROCEDURE — 96365 THER/PROPH/DIAG IV INF INIT: CPT

## 2022-07-02 PROCEDURE — 96361 HYDRATE IV INFUSION ADD-ON: CPT

## 2022-07-02 PROCEDURE — 99284 EMERGENCY DEPT VISIT MOD MDM: CPT | Mod: 25

## 2022-07-02 PROCEDURE — 25000003 PHARM REV CODE 250: Performed by: EMERGENCY MEDICINE

## 2022-07-02 PROCEDURE — 80053 COMPREHEN METABOLIC PANEL: CPT | Performed by: EMERGENCY MEDICINE

## 2022-07-02 PROCEDURE — 81025 URINE PREGNANCY TEST: CPT | Performed by: EMERGENCY MEDICINE

## 2022-07-02 PROCEDURE — 81000 URINALYSIS NONAUTO W/SCOPE: CPT | Performed by: EMERGENCY MEDICINE

## 2022-07-02 PROCEDURE — 85025 COMPLETE CBC W/AUTO DIFF WBC: CPT | Performed by: EMERGENCY MEDICINE

## 2022-07-02 PROCEDURE — 96375 TX/PRO/DX INJ NEW DRUG ADDON: CPT

## 2022-07-02 PROCEDURE — 63600175 PHARM REV CODE 636 W HCPCS: Performed by: EMERGENCY MEDICINE

## 2022-07-02 PROCEDURE — 83690 ASSAY OF LIPASE: CPT | Performed by: EMERGENCY MEDICINE

## 2022-07-02 RX ORDER — LIDOCAINE HYDROCHLORIDE 20 MG/ML
10 SOLUTION OROPHARYNGEAL ONCE
Status: COMPLETED | OUTPATIENT
Start: 2022-07-02 | End: 2022-07-02

## 2022-07-02 RX ORDER — ONDANSETRON 2 MG/ML
8 INJECTION INTRAMUSCULAR; INTRAVENOUS
Status: COMPLETED | OUTPATIENT
Start: 2022-07-02 | End: 2022-07-02

## 2022-07-02 RX ORDER — CEPHALEXIN 500 MG/1
500 CAPSULE ORAL EVERY 12 HOURS
Qty: 10 CAPSULE | Refills: 0 | Status: SHIPPED | OUTPATIENT
Start: 2022-07-02 | End: 2022-07-07

## 2022-07-02 RX ORDER — MAG HYDROX/ALUMINUM HYD/SIMETH 200-200-20
30 SUSPENSION, ORAL (FINAL DOSE FORM) ORAL ONCE
Status: COMPLETED | OUTPATIENT
Start: 2022-07-02 | End: 2022-07-02

## 2022-07-02 RX ORDER — ONDANSETRON 8 MG/1
8 TABLET, ORALLY DISINTEGRATING ORAL EVERY 12 HOURS PRN
Qty: 12 TABLET | Refills: 0 | Status: SHIPPED | OUTPATIENT
Start: 2022-07-02 | End: 2023-10-24

## 2022-07-02 RX ADMIN — ALUMINUM HYDROXIDE, MAGNESIUM HYDROXIDE, AND SIMETHICONE 30 ML: 200; 200; 20 SUSPENSION ORAL at 04:07

## 2022-07-02 RX ADMIN — CEFTRIAXONE 1 G: 1 INJECTION, SOLUTION INTRAVENOUS at 05:07

## 2022-07-02 RX ADMIN — ONDANSETRON 8 MG: 2 INJECTION INTRAMUSCULAR; INTRAVENOUS at 02:07

## 2022-07-02 RX ADMIN — SODIUM CHLORIDE 1000 ML: 0.9 INJECTION, SOLUTION INTRAVENOUS at 02:07

## 2022-07-02 RX ADMIN — LIDOCAINE HYDROCHLORIDE 10 ML: 20 SOLUTION ORAL; TOPICAL at 04:07

## 2022-07-02 NOTE — ED PROVIDER NOTES
EMERGENCY DEPARTMENT HISTORY AND PHYSICAL EXAM          Date: 7/2/2022   Patient Name: Anna H Collette       History of Presenting Illness           Chief Complaint   Patient presents with    Vomiting     Vomiting and diarrhea since this morning.  Denies being around anyone that has been sick.           History Provided By: Patient    1443   Anna H Collette is a 41 y.o. female with PMHX of anxiety, depression, tobacco use, daily marijuana use who presents to the emergency department C/O nausea vomiting.    Patient reports nausea vomiting all day today.  Does not feeling that well yesterday but was not sick.  No sick contacts.  States she gets this every month or so.  Reports hot showers typically will help her symptoms as well as Phenergan which is sometimes prescribed for her.  No abdominal pain.      PCP: [facility]        Current Facility-Administered Medications   Medication Dose Route Frequency Provider Last Rate Last Admin    cefTRIAXone (ROCEPHIN) 1 g/50 mL D5W IVPB  1 g Intravenous ED 1 Time Abhi Cerna MD         Current Outpatient Medications   Medication Sig Dispense Refill    albuterol (PROVENTIL/VENTOLIN HFA) 90 mcg/actuation inhaler Inhale 1-2 puffs into the lungs every 6 (six) hours as needed for Wheezing. Rescue 6.7 g 0    azithromycin (Z-TANNER) 250 MG tablet Take 1 tablet (250 mg total) by mouth once daily. Take first 2 tablets together, then 1 every day until finished. 6 tablet 0    cephALEXin (KEFLEX) 500 MG capsule Take 1 capsule (500 mg total) by mouth every 12 (twelve) hours. for 5 days 10 capsule 0    DULoxetine (CYMBALTA) 30 MG capsule Take 1 capsule (30 mg total) by mouth once daily. 30 capsule 0    hydrOXYzine pamoate (VISTARIL) 50 MG Cap Take 1 capsule (50 mg total) by mouth nightly as needed (As needed for sleep). 10 capsule 0    ondansetron (ZOFRAN) 4 MG tablet Take 1 tablet (4 mg total) by mouth every 6 (six) hours. (Patient taking differently: Take  "22 mg by mouth every 6 (six) hours.) 30 tablet 0    ondansetron (ZOFRAN-ODT) 8 MG TbDL Take 1 tablet (8 mg total) by mouth every 12 (twelve) hours as needed ([place]). 12 tablet 0    sumatriptan (IMITREX) 50 MG tablet Take 1 tablet (50 mg total) by mouth every 2 (two) hours as needed for Migraine. 12 tablet 0           Past History     Past Medical History:   Past Medical History:   Diagnosis Date    Anxiety     Arrhythmia     "They say my heart skips a beat"    Depression         Past Surgical History:   Past Surgical History:   Procedure Laterality Date     SECTION          Family History:   Family History   Problem Relation Age of Onset    Cancer Mother         Social History:   Social History     Tobacco Use    Smoking status: Current Every Day Smoker     Packs/day: 1.00     Types: Cigarettes    Smokeless tobacco: Never Used   Substance Use Topics    Alcohol use: Not Currently    Drug use: Yes     Types: Marijuana        Allergies:   Review of patient's allergies indicates:  No Known Allergies       Review of Systems   Review of Systems   Constitutional: Negative for chills, fatigue and fever.   Respiratory: Negative for cough and shortness of breath.    Gastrointestinal: Positive for diarrhea, nausea and vomiting. Negative for abdominal pain.   All other systems reviewed and are negative.               Physical Exam     Vitals:    22 1436 22 1444   BP: 120/66    Pulse: 72    Resp: 16    Temp:  98.7 °F (37.1 °C)   TempSrc:  Oral   SpO2: 98%    Weight: 36.3 kg (80 lb)    Height: 5' 3" (1.6 m)       Physical Exam  Vitals and nursing note reviewed.   Constitutional:       General: She is not in acute distress.     Appearance: Normal appearance. She is ill-appearing (appears nauseous, dry heaving).   HENT:      Head: Normocephalic and atraumatic.      Right Ear: External ear normal.      Left Ear: External ear normal.      Nose: Nose normal. No congestion or rhinorrhea.      " Mouth/Throat:      Mouth: Mucous membranes are moist.   Eyes:      Conjunctiva/sclera: Conjunctivae normal.      Pupils: Pupils are equal, round, and reactive to light.   Pulmonary:      Effort: Pulmonary effort is normal. No respiratory distress.   Musculoskeletal:         General: No deformity. Normal range of motion.      Cervical back: Normal range of motion. No rigidity.   Skin:     General: Skin is dry.   Neurological:      General: No focal deficit present.      Mental Status: She is alert and oriented to person, place, and time. Mental status is at baseline.   Psychiatric:         Mood and Affect: Mood normal.         Behavior: Behavior normal.              Diagnostic Study Results      Labs -   Recent Results (from the past 12 hour(s))   CBC W/ AUTO DIFFERENTIAL    Collection Time: 07/02/22  2:49 PM   Result Value Ref Range    WBC 10.41 3.90 - 12.70 K/uL    RBC 4.68 4.00 - 5.40 M/uL    Hemoglobin 13.7 12.0 - 16.0 g/dL    Hematocrit 40.4 37.0 - 48.5 %    MCV 86 82 - 98 fL    MCH 29.3 27.0 - 31.0 pg    MCHC 33.9 32.0 - 36.0 g/dL    RDW 13.3 11.5 - 14.5 %    Platelets 214 150 - 450 K/uL    MPV 11.7 9.2 - 12.9 fL    Immature Granulocytes 0.3 0.0 - 0.5 %    Gran # (ANC) 9.2 (H) 1.8 - 7.7 K/uL    Immature Grans (Abs) 0.03 0.00 - 0.04 K/uL    Lymph # 0.3 (L) 1.0 - 4.8 K/uL    Mono # 0.8 0.3 - 1.0 K/uL    Eos # 0.1 0.0 - 0.5 K/uL    Baso # 0.06 0.00 - 0.20 K/uL    nRBC 0 0 /100 WBC    Gran % 88.5 (H) 38.0 - 73.0 %    Lymph % 2.4 (L) 18.0 - 48.0 %    Mono % 7.2 4.0 - 15.0 %    Eosinophil % 1.0 0.0 - 8.0 %    Basophil % 0.6 0.0 - 1.9 %    Differential Method Automated    Comp. Metabolic Panel    Collection Time: 07/02/22  2:49 PM   Result Value Ref Range    Sodium 139 136 - 145 mmol/L    Potassium 3.7 3.5 - 5.1 mmol/L    Chloride 108 95 - 110 mmol/L    CO2 24 23 - 29 mmol/L    Glucose 128 (H) 70 - 110 mg/dL    BUN 11 6 - 20 mg/dL    Creatinine 0.6 0.5 - 1.4 mg/dL    Calcium 8.6 (L) 8.7 - 10.5 mg/dL    Total Protein  7.9 6.0 - 8.4 g/dL    Albumin 4.1 3.5 - 5.2 g/dL    Total Bilirubin 0.4 0.1 - 1.0 mg/dL    Alkaline Phosphatase 94 55 - 135 U/L    AST 25 10 - 40 U/L    ALT 46 (H) 10 - 44 U/L    Anion Gap 7 (L) 8 - 16 mmol/L    eGFR if African American >60.0 >60 mL/min/1.73 m^2    eGFR if non African American >60.0 >60 mL/min/1.73 m^2   Lipase    Collection Time: 07/02/22  2:49 PM   Result Value Ref Range    Lipase Result 94 23 - 300 U/L   Urinalysis, Reflex to Urine Culture Urine, Clean Catch    Collection Time: 07/02/22  4:27 PM    Specimen: Urine, Catheterized   Result Value Ref Range    Specimen UA Urine, Catheterized     Color, UA Yellow Yellow, Straw, Latonya    Appearance, UA Clear Clear    pH, UA 6.0 5.0 - 8.0    Specific Gravity, UA 1.020 1.005 - 1.030    Protein, UA Trace (A) Negative    Glucose, UA Negative Negative    Ketones, UA 1+ (A) Negative    Bilirubin (UA) Negative Negative    Occult Blood UA Negative Negative    Nitrite, UA Positive (A) Negative    Urobilinogen, UA 1.0 <2.0 EU/dL    Leukocytes, UA Negative Negative   Urinalysis Microscopic    Collection Time: 07/02/22  4:27 PM   Result Value Ref Range    RBC, UA 2 0 - 4 /hpf    WBC, UA 9 (H) 0 - 5 /hpf    Bacteria Moderate (A) None-Occ /hpf    Squam Epithel, UA 5 /hpf    Hyaline Casts, UA 9.0 (A) 0-1/lpf /lpf    Microscopic Comment SEE COMMENT    Pregnancy, urine rapid    Collection Time: 07/02/22  4:28 PM   Result Value Ref Range    Preg Test, Ur Negative         Radiologic Studies -    No orders to display        Medications given in the ED-   Medications   cefTRIAXone (ROCEPHIN) 1 g/50 mL D5W IVPB (has no administration in time range)   ondansetron injection 8 mg (8 mg Intravenous Given 7/2/22 1467)   sodium chloride 0.9% bolus 1,000 mL (0 mLs Intravenous Stopped 7/2/22 1603)   aluminum-magnesium hydroxide-simethicone 200-200-20 mg/5 mL suspension 30 mL (30 mLs Oral Given 7/2/22 0055)     And   LIDOcaine HCl 2% oral solution 10 mL (10 mLs Oral Given 7/2/22  1620)           Medical Decision Making    I am the first provider for this patient.     I reviewed the vital signs, available nursing notes, past medical history, past surgical history, family history and social history.     Vital Signs:  Reviewed the patient's vital signs.     Pulse Oximetry Analysis and Interpretation:    98% on Room Air, normal        Records Reviewed: Old medical records.  Nursing notes.        Provider Notes (Medical Decision Making): Anna H Collette is a 41 y.o. female if nausea and vomiting.  Nontender abdomen.  Vital signs within normal limits.  Patient only dry heaving in ER.  Differential includes viral gastritis, pancreatitis, colitis, cannabioid hyperemesis        Procedures:   Procedures      ED Course:    5:21 PM  The patient reports she feels better.  She found to have UTI on UA.  Will treat as this may be contributory towards patient's symptoms.  I also advised she trial discontinuing THC use suspect the etiology of her presentation today is cannaboid hyperemesis syndrome .  Patient labs are benign and without focal abdominal pain.           Diagnosis and Disposition     Critical Care:      DISCHARGE NOTE:       Anna H Collette's  results have been reviewed with her.  She has been counseled regarding her diagnosis, treatment, and plan.  She verbally conveys understanding and agreement of the signs, symptoms, diagnosis, treatment and prognosis and additionally agrees to follow up as discussed.  She also agrees with the care-plan and conveys that all of her questions have been answered.  I have also provided discharge instructions for her that include: educational information regarding their diagnosis and treatment, and list of reasons why they would want to return to the ED prior to their follow-up appointment, should her condition change. She has been provided with education for proper emergency department utilization.         CLINICAL IMPRESSION:         1. Non-intractable vomiting  with nausea, unspecified vomiting type    2. Marijuana use    3. Acute cystitis without hematuria              PLAN:   1. Discharge Home  2.      Medication List      START taking these medications    cephALEXin 500 MG capsule  Commonly known as: KEFLEX  Take 1 capsule (500 mg total) by mouth every 12 (twelve) hours. for 5 days     ondansetron 8 MG Tbdl  Commonly known as: ZOFRAN-ODT  Take 1 tablet (8 mg total) by mouth every 12 (twelve) hours as needed ([place]a).        ASK your doctor about these medications    albuterol 90 mcg/actuation inhaler  Commonly known as: PROVENTIL/VENTOLIN HFA  Inhale 1-2 puffs into the lungs every 6 (six) hours as needed for Wheezing. Rescue     azithromycin 250 MG tablet  Commonly known as: Z-TANNER  Take 1 tablet (250 mg total) by mouth once daily. Take first 2 tablets together, then 1 every day until finished.     DULoxetine 30 MG capsule  Commonly known as: CYMBALTA  Take 1 capsule (30 mg total) by mouth once daily.     hydrOXYzine pamoate 50 MG Cap  Commonly known as: VISTARIL  Take 1 capsule (50 mg total) by mouth nightly as needed (As needed for sleep).     ondansetron 4 MG tablet  Commonly known as: ZOFRAN  Take 1 tablet (4 mg total) by mouth every 6 (six) hours.     sumatriptan 50 MG tablet  Commonly known as: IMITREX  Take 1 tablet (50 mg total) by mouth every 2 (two) hours as needed for Migraine.           Where to Get Your Medications      These medications were sent to [id number] [address] [facility] & [address] [address]    Phone: [phone]   · cephALEXin 500 MG capsule  · ondansetron 8 MG Tbdl        [address]. [facility]  [address] [facility] [address] [phone]    Schedule an appointment as soon as possible for a visit   Primary care follow up       _______________________________     Please note that this dictation was completed with M*Modal, the computer voice  recognition software.  Quite often unanticipated grammatical, syntax, homophones, and other interpretive errors are inadvertently transcribed by the computer software.  Please disregard these errors.  Please excuse any errors that have escaped final proofreading.             Abhi Cerna MD  07/02/22 0176

## 2022-07-02 NOTE — Clinical Note
"Kelly Christensen" Collette was seen and treated in our emergency department on 7/2/2022.  She may return to work on 07/05/2022.       If you have any questions or concerns, please don't hesitate to call.      Theresa Sabillon RN    "

## 2023-03-30 ENCOUNTER — HOSPITAL ENCOUNTER (EMERGENCY)
Facility: HOSPITAL | Age: 43
Discharge: HOME | End: 2023-03-30
Attending: EMERGENCY MEDICINE
Payer: MEDICAID

## 2023-03-30 VITALS
TEMPERATURE: 98 F | HEIGHT: 63 IN | SYSTOLIC BLOOD PRESSURE: 117 MMHG | RESPIRATION RATE: 16 BRPM | BODY MASS INDEX: 16.66 KG/M2 | DIASTOLIC BLOOD PRESSURE: 59 MMHG | HEART RATE: 60 BPM | OXYGEN SATURATION: 98 % | WEIGHT: 94 LBS

## 2023-03-30 DIAGNOSIS — J06.9 VIRAL URI WITH COUGH: Primary | ICD-10-CM

## 2023-03-30 LAB
CTP QC/QA: YES
CTP QC/QA: YES
POC MOLECULAR INFLUENZA A AGN: NEGATIVE
POC MOLECULAR INFLUENZA B AGN: NEGATIVE
SARS-COV-2 RDRP RESP QL NAA+PROBE: NEGATIVE

## 2023-03-30 PROCEDURE — 87502 INFLUENZA DNA AMP PROBE: CPT

## 2023-03-30 PROCEDURE — 99284 EMERGENCY DEPT VISIT MOD MDM: CPT

## 2023-03-30 RX ORDER — DIPHENOXYLATE HYDROCHLORIDE AND ATROPINE SULFATE 2.5; .025 MG/1; MG/1
1 TABLET ORAL 4 TIMES DAILY PRN
Qty: 10 TABLET | Refills: 0 | Status: SHIPPED | OUTPATIENT
Start: 2023-03-30 | End: 2023-04-09

## 2023-03-30 RX ORDER — PREDNISONE 20 MG/1
20 TABLET ORAL DAILY
Qty: 5 TABLET | Refills: 0 | Status: SHIPPED | OUTPATIENT
Start: 2023-03-30 | End: 2023-04-04

## 2023-03-30 RX ORDER — ALBUTEROL SULFATE 90 UG/1
1-2 AEROSOL, METERED RESPIRATORY (INHALATION) EVERY 6 HOURS PRN
Qty: 6.7 G | Refills: 0 | Status: SHIPPED | OUTPATIENT
Start: 2023-03-30 | End: 2024-03-29

## 2023-03-30 RX ORDER — PROMETHAZINE HYDROCHLORIDE AND DEXTROMETHORPHAN HYDROBROMIDE 6.25; 15 MG/5ML; MG/5ML
5 SYRUP ORAL EVERY 4 HOURS PRN
Qty: 120 ML | Refills: 0 | Status: SHIPPED | OUTPATIENT
Start: 2023-03-30 | End: 2023-04-09

## 2023-03-30 NOTE — ED PROVIDER NOTES
"Encounter Date: 3/30/2023       History     Chief Complaint   Patient presents with    Chest Congestion    Diarrhea     Pt stated that she has been experiencing chest congestion/discomfort with diarrhea / cough for the past couple days.      42-year-old female presents to the emergency room with chest congestion, diarrhea, cough for the last few days.  Patient reports multiple sick coworkers.  History of anxiety, depression  , cough for the past few days  Review of patient's allergies indicates:  No Known Allergies  Past Medical History:   Diagnosis Date    Anxiety     Arrhythmia     "They say my heart skips a beat"    Depression      Past Surgical History:   Procedure Laterality Date     SECTION       Family History   Problem Relation Age of Onset    Cancer Mother      Social History     Tobacco Use    Smoking status: Every Day     Packs/day: 1.00     Types: Cigarettes    Smokeless tobacco: Never   Substance Use Topics    Alcohol use: Not Currently    Drug use: Yes     Types: Marijuana     Review of Systems   Constitutional:  Negative for fever.   HENT:  Positive for congestion. Negative for sore throat.    Respiratory:  Positive for cough. Negative for shortness of breath.    Cardiovascular:  Negative for chest pain.   Gastrointestinal:  Positive for diarrhea. Negative for nausea.   Genitourinary:  Negative for dysuria.   Musculoskeletal:  Negative for back pain.   Skin:  Negative for rash.   Neurological:  Negative for weakness.   Hematological:  Does not bruise/bleed easily.   All other systems reviewed and are negative.    Physical Exam     Initial Vitals [23 1016]   BP Pulse Resp Temp SpO2   (!) 117/59 60 16 97.6 °F (36.4 °C) 98 %      MAP       --         Physical Exam    Nursing note and vitals reviewed.  Constitutional: She appears well-developed and well-nourished.   HENT:   Head: Normocephalic and atraumatic.   Eyes: Pupils are equal, round, and reactive to light.   Neck:   Normal range of " motion.  Cardiovascular:  Normal rate and regular rhythm.           Pulmonary/Chest: Breath sounds normal.   Abdominal: Abdomen is soft. Bowel sounds are normal.   Musculoskeletal:         General: Normal range of motion.      Cervical back: Normal range of motion.     Neurological: She is alert and oriented to person, place, and time.   Skin: Skin is warm and dry.   Psychiatric: She has a normal mood and affect.       ED Course   Procedures  Labs Reviewed   SARS-COV-2 RDRP GENE    Narrative:     .This test utilizes isothermal nucleic acid amplification technology to detect the SARS-CoV-2 RdRp nucleic acid segment. The analytical sensitivity (limit of detection) is 500 copies/swab.     A POSITIVE result is indicative of the presence of SARS-CoV-2 RNA; clinical correlation with patient history and other diagnostic information is necessary to determine patient infection status.    A NEGATIVE result means that SARS-CoV-2 nucleic acids are not present above the limit of detection. A NEGATIVE result should be treated as presumptive. It does not rule out the possibility of COVID-19 and should not be the sole basis for treatment decisions. If COVID-19 is strongly suspected based on clinical and exposure history, re-testing using an alternate molecular assay should be considered.     This test is only for use under the Food and Drug Administration s Emergency Use Authorization (EUA).     Commercial kits are provided by [employer]. Performance characteristics of the EUA have been independently verified by [facility] Department of Pathology and Laboratory Medicine.   _________________________________________________________________   The authorized Fact Sheet for Healthcare Providers and the authorized Fact Sheet for Patients of the ID NOW COVID-19 are available on the FDA website:    https://www.fda.gov/media/627936/download      https://www.fda.gov/media/293455/download       POCT INFLUENZA A/B MOLECULAR           Imaging Results    None          Medications - No data to display  Medical Decision Making:   Differential Diagnosis:   COVID, flu, URI  Clinical Tests:   Lab Tests: Ordered and Reviewed                        Clinical Impression:   Final diagnoses:  [J06.9] Viral URI with cough (Primary)        ED Disposition Condition    Discharge Stable          ED Prescriptions       Medication Sig Dispense Start Date End Date Auth. Provider    albuterol (PROVENTIL/VENTOLIN HFA) 90 mcg/actuation inhaler Inhale 1-2 puffs into the lungs every 6 (six) hours as needed for Wheezing. Rescue 6.7 g 3/30/2023 3/29/2024 Mone Farah NP    promethazine-dextromethorphan (PROMETHAZINE-DM) 6.25-15 mg/5 mL Syrp Take 5 mLs by mouth every 4 (four) hours as needed. 120 mL 3/30/2023 4/9/2023 Mone Farah NP    diphenoxylate-atropine 2.5-0.025 mg (LOMOTIL) 2.5-0.025 mg per tablet Take 1 tablet by mouth 4 (four) times daily as needed for Diarrhea. 10 tablet 3/30/2023 4/9/2023 Mone Farah NP    predniSONE (DELTASONE) 20 MG tablet Take 1 tablet (20 mg total) by mouth once daily. for 5 days 5 tablet 3/30/2023 4/4/2023 Mone Farah NP          Follow-up Information    None          Mone Farah NP  03/30/23 1045

## 2023-03-30 NOTE — Clinical Note
"Kelly Chrisetnsen" Collette was seen and treated in our emergency department on 3/30/2023.  She may return to work on 04/01/2023.       If you have any questions or concerns, please don't hesitate to call.      Simeon Palacios MD"

## 2023-04-01 ENCOUNTER — HOSPITAL ENCOUNTER (EMERGENCY)
Facility: HOSPITAL | Age: 43
Discharge: HOME | End: 2023-04-01
Attending: EMERGENCY MEDICINE
Payer: MEDICAID

## 2023-04-01 VITALS
RESPIRATION RATE: 18 BRPM | TEMPERATURE: 98 F | OXYGEN SATURATION: 97 % | BODY MASS INDEX: 16.48 KG/M2 | HEIGHT: 63 IN | HEART RATE: 67 BPM | SYSTOLIC BLOOD PRESSURE: 103 MMHG | DIASTOLIC BLOOD PRESSURE: 79 MMHG | WEIGHT: 93 LBS

## 2023-04-01 DIAGNOSIS — T14.90XA TRAUMA: ICD-10-CM

## 2023-04-01 DIAGNOSIS — S83.411A SPRAIN OF MEDIAL COLLATERAL LIGAMENT OF RIGHT KNEE, INITIAL ENCOUNTER: Primary | ICD-10-CM

## 2023-04-01 PROCEDURE — 96372 THER/PROPH/DIAG INJ SC/IM: CPT | Performed by: NURSE PRACTITIONER

## 2023-04-01 PROCEDURE — 63600175 PHARM REV CODE 636 W HCPCS: Performed by: NURSE PRACTITIONER

## 2023-04-01 PROCEDURE — 99284 EMERGENCY DEPT VISIT MOD MDM: CPT

## 2023-04-01 RX ORDER — KETOROLAC TROMETHAMINE 30 MG/ML
30 INJECTION, SOLUTION INTRAMUSCULAR; INTRAVENOUS
Status: COMPLETED | OUTPATIENT
Start: 2023-04-01 | End: 2023-04-01

## 2023-04-01 RX ORDER — NAPROXEN 500 MG/1
500 TABLET ORAL 2 TIMES DAILY WITH MEALS
Qty: 60 TABLET | Refills: 0 | Status: SHIPPED | OUTPATIENT
Start: 2023-04-01 | End: 2023-09-11

## 2023-04-01 RX ADMIN — KETOROLAC TROMETHAMINE 30 MG: 30 INJECTION, SOLUTION INTRAMUSCULAR; INTRAVENOUS at 12:04

## 2023-04-01 NOTE — Clinical Note
"Anna "Anna" Collette was seen and treated in our emergency department on 4/1/2023.  She may return to work on 04/05/2023.       If you have any questions or concerns, please don't hesitate to call.      Bharat Andres III, NP"

## 2023-04-01 NOTE — ED PROVIDER NOTES
"Encounter Date: 2023       History     Chief Complaint   Patient presents with    Knee Pain     Complains of right medial knee pain after falling while getting off of the sofa this morning. Ambulatory, limping.       42-year-old female complains of right knee pain since this morning.  Patient states she got up off the sofa this morning felt pain.  Patient is ambulatory but limping.  Denies any burning, popping,    Review of patient's allergies indicates:  No Known Allergies  Past Medical History:   Diagnosis Date    Anxiety     Arrhythmia     "They say my heart skips a beat"    Depression      Past Surgical History:   Procedure Laterality Date     SECTION       Family History   Problem Relation Age of Onset    Cancer Mother      Social History     Tobacco Use    Smoking status: Every Day     Packs/day: 1.00     Types: Cigarettes    Smokeless tobacco: Never   Substance Use Topics    Alcohol use: Not Currently    Drug use: Yes     Types: Marijuana     Review of Systems   Constitutional:  Negative for fever.   HENT:  Negative for sore throat.    Respiratory:  Negative for shortness of breath.    Cardiovascular:  Negative for chest pain.   Gastrointestinal:  Negative for nausea.   Genitourinary:  Negative for dysuria.   Musculoskeletal:  Negative for back pain.        Right medial knee pain   Skin:  Negative for rash.   Neurological:  Negative for weakness.   Hematological:  Does not bruise/bleed easily.     Physical Exam     Initial Vitals [23 1135]   BP Pulse Resp Temp SpO2   103/79 67 18 98.3 °F (36.8 °C) 97 %      MAP       --         Physical Exam    Nursing note and vitals reviewed.  Constitutional: Vital signs are normal. She appears well-developed and well-nourished. She is cooperative.   HENT:   Head: Normocephalic and atraumatic.   Right Ear: Hearing and external ear normal.   Left Ear: Hearing and external ear normal.   Nose: Nose normal.   Mouth/Throat: Uvula is midline, oropharynx is clear " and moist and mucous membranes are normal.   Eyes: Conjunctivae, EOM and lids are normal. Pupils are equal, round, and reactive to light.   Neck: Trachea normal. Neck supple.   Normal range of motion.   Full passive range of motion without pain.     Cardiovascular:  Normal rate, regular rhythm, S1 normal, S2 normal, normal heart sounds and normal pulses.           Pulmonary/Chest: Effort normal and breath sounds normal.   Musculoskeletal:      Cervical back: Full passive range of motion without pain, normal range of motion and neck supple.      Right knee: Swelling present. No deformity, effusion, erythema or ecchymosis. Normal range of motion. Tenderness present. No medial joint line tenderness.      Left knee: Normal.     Neurological: She is alert.       ED Course   Procedures  Labs Reviewed - No data to display       Imaging Results              X-Ray Knee 3 View Right (Final result)  Result time 04/01/23 12:19:05      Final result by Reva Tierney MD (04/01/23 12:19:05)                   Impression:      Normal right knee      Electronically signed by: Reva Tierney MD  Date:    04/01/2023  Time:    12:19               Narrative:    EXAMINATION:  XR KNEE 3 VIEW RIGHT    CLINICAL HISTORY:  Injury, unspecified, initial encounter    COMPARISON:  12/13/2021    FINDINGS:  Right knee is intact with no fracture and no significant effusion.                                    X-Rays:   Independently Interpreted Readings:   Other Readings:  Negative for fracture  Medications   ketorolac injection 30 mg (30 mg Intramuscular Given 4/1/23 1209)     Medical Decision Making:   Differential Diagnosis:   Knee sprain, fracture  ED Management:  After history and physical exam x-ray was negative for fracture.  Discussed with patient as she has a sprain of her right knee.  Will place Ace wrap Ace wrap                        Clinical Impression:   Final diagnoses:  [T14.90XA] Trauma  [S83.411A] Sprain of medial collateral ligament  of right knee, initial encounter (Primary)        ED Disposition Condition    Discharge Stable          ED Prescriptions       Medication Sig Dispense Start Date End Date Auth. Provider    naproxen (NAPROSYN) 500 MG tablet Take 1 tablet (500 mg total) by mouth 2 (two) times daily with meals. 60 tablet 4/1/2023 -- Bharat Andres III, NP          Follow-up Information       Follow up With Specialties Details Why Contact Info    [facility] Psychology, Internal Medicine, Gynecology, Dental General Practice In 3 days  [address] [facility] [address]  [phone]               Bharat Andres III, NP  04/01/23 1232

## 2023-07-08 ENCOUNTER — HOSPITAL ENCOUNTER (EMERGENCY)
Facility: HOSPITAL | Age: 43
Discharge: HOME | End: 2023-07-08
Attending: STUDENT IN AN ORGANIZED HEALTH CARE EDUCATION/TRAINING PROGRAM
Payer: MEDICAID

## 2023-07-08 VITALS
HEART RATE: 52 BPM | OXYGEN SATURATION: 99 % | BODY MASS INDEX: 15.35 KG/M2 | DIASTOLIC BLOOD PRESSURE: 70 MMHG | HEIGHT: 63 IN | SYSTOLIC BLOOD PRESSURE: 107 MMHG | WEIGHT: 86.63 LBS | TEMPERATURE: 98 F | RESPIRATION RATE: 18 BRPM

## 2023-07-08 DIAGNOSIS — F41.9 ANXIETY: Primary | ICD-10-CM

## 2023-07-08 PROCEDURE — 99283 EMERGENCY DEPT VISIT LOW MDM: CPT

## 2023-07-08 PROCEDURE — 25000003 PHARM REV CODE 250: Performed by: STUDENT IN AN ORGANIZED HEALTH CARE EDUCATION/TRAINING PROGRAM

## 2023-07-08 RX ORDER — HYDROXYZINE PAMOATE 50 MG/1
50 CAPSULE ORAL EVERY 6 HOURS PRN
Qty: 10 CAPSULE | Refills: 0 | Status: SHIPPED | OUTPATIENT
Start: 2023-07-08

## 2023-07-08 RX ORDER — HYDROXYZINE PAMOATE 25 MG/1
50 CAPSULE ORAL
Status: COMPLETED | OUTPATIENT
Start: 2023-07-08 | End: 2023-07-08

## 2023-07-08 RX ADMIN — HYDROXYZINE PAMOATE 50 MG: 25 CAPSULE ORAL at 10:07

## 2023-07-08 NOTE — Clinical Note
"Kelly Christensen" Collette was seen and treated in our emergency department on 7/8/2023.  She may return to work on 07/10/2023.       If you have any questions or concerns, please don't hesitate to call.      Christiano Fox MD"

## 2023-07-08 NOTE — ED PROVIDER NOTES
"Encounter Date: 2023       History     Chief Complaint   Patient presents with    Anxiety     Per patient, she has been having bad anxiety and reports numbness and pain in fingers. Onset 2-3 days.      42-year-old female with history of anxiety and depression presents with anxiety.  Patient said that she has not had a chance to follow-up because she is in the process of moving.  Patient denies any suicidal ideation, auditory hallucination, excessive caffeine use, chest pain.  Patient says that she normally his Xanax for anxiety    Review of patient's allergies indicates:  No Known Allergies  Past Medical History:   Diagnosis Date    Anxiety     Arrhythmia     "They say my heart skips a beat"    Depression      Past Surgical History:   Procedure Laterality Date     SECTION       Family History   Problem Relation Age of Onset    Cancer Mother      Social History     Tobacco Use    Smoking status: Every Day     Packs/day: 1.00     Types: Cigarettes    Smokeless tobacco: Never   Substance Use Topics    Alcohol use: Not Currently    Drug use: Yes     Types: Marijuana     Review of Systems   Constitutional: Negative.    HENT: Negative.     Respiratory: Negative.     Cardiovascular: Negative.    Gastrointestinal: Negative.    Genitourinary: Negative.    Musculoskeletal: Negative.    Skin: Negative.    Neurological: Negative.    Psychiatric/Behavioral:  The patient is nervous/anxious.    All other systems reviewed and are negative.    Physical Exam     Initial Vitals   BP Pulse Resp Temp SpO2   23 1039 23 1038 23 1038 23 1038 23 1038   107/70 (!) 52 18 98.1 °F (36.7 °C) 99 %      MAP       --                Physical Exam    Nursing note and vitals reviewed.  Constitutional: Vital signs are normal. She appears well-developed and well-nourished.   HENT:   Head: Normocephalic and atraumatic.   Eyes: Conjunctivae and lids are normal.   Neck: Trachea normal. Neck supple. "   Cardiovascular:  Normal rate, regular rhythm, normal heart sounds, intact distal pulses and normal pulses.           No murmur heard.  Pulmonary/Chest: Breath sounds normal. No respiratory distress.   Abdominal: Abdomen is soft. Bowel sounds are normal.   Musculoskeletal:         General: Normal range of motion.      Cervical back: Neck supple.     Neurological: She is alert and oriented to person, place, and time. She has normal strength. No cranial nerve deficit or sensory deficit.   Skin: Skin is warm. Capillary refill takes less than 2 seconds.   Psychiatric: She has a normal mood and affect. Her speech is normal. Thought content normal.   Anxious       ED Course   Procedures  Labs Reviewed - No data to display       Imaging Results    None          Medications   hydrOXYzine pamoate capsule 50 mg (has no administration in time range)     Medical Decision Making:   Initial Assessment:   42-year-old female with history of anxiety and depression presents with anxiety.  Afebrile vitals stable.  Physical noted.  Will advise patient follow-up with mental health.  No indication for psychiatric admission                         Clinical Impression:   Final diagnoses:  [F41.9] Anxiety (Primary)        ED Disposition Condition    Discharge Stable          ED Prescriptions       Medication Sig Dispense Start Date End Date Auth. Provider    hydrOXYzine pamoate (VISTARIL) 50 MG Cap Take 1 capsule (50 mg total) by mouth every 6 (six) hours as needed (As needed for sleep). 10 capsule 7/8/2023 -- Christiano Fox MD          Follow-up Information       Follow up With Specialties Details Why Contact Info    [facility]  In 2 days  [address] [facility] [address] [phone]               Christiano Fox MD  07/08/23 0236

## 2023-07-12 ENCOUNTER — HOSPITAL ENCOUNTER (EMERGENCY)
Facility: HOSPITAL | Age: 43
Discharge: HOME | End: 2023-07-12
Attending: EMERGENCY MEDICINE
Payer: MEDICAID

## 2023-07-12 VITALS
HEART RATE: 55 BPM | HEIGHT: 63 IN | OXYGEN SATURATION: 99 % | TEMPERATURE: 98 F | SYSTOLIC BLOOD PRESSURE: 117 MMHG | BODY MASS INDEX: 14.88 KG/M2 | DIASTOLIC BLOOD PRESSURE: 52 MMHG | WEIGHT: 84 LBS | RESPIRATION RATE: 16 BRPM

## 2023-07-12 DIAGNOSIS — B34.9 VIRAL ILLNESS: ICD-10-CM

## 2023-07-12 DIAGNOSIS — R20.2 PARESTHESIA: Primary | ICD-10-CM

## 2023-07-12 LAB
ALBUMIN SERPL BCP-MCNC: 4 G/DL (ref 3.5–5.2)
ALP SERPL-CCNC: 93 U/L (ref 55–135)
ALT SERPL W/O P-5'-P-CCNC: 43 U/L (ref 10–44)
ANION GAP SERPL CALC-SCNC: 5 MMOL/L (ref 8–16)
AST SERPL-CCNC: 22 U/L (ref 10–40)
BASOPHILS # BLD AUTO: 0.07 K/UL (ref 0–0.2)
BASOPHILS NFR BLD: 1 % (ref 0–1.9)
BILIRUB SERPL-MCNC: 0.7 MG/DL (ref 0.1–1)
BUN SERPL-MCNC: 12 MG/DL (ref 6–20)
CALCIUM SERPL-MCNC: 9.5 MG/DL (ref 8.7–10.5)
CHLORIDE SERPL-SCNC: 105 MMOL/L (ref 95–110)
CO2 SERPL-SCNC: 28 MMOL/L (ref 23–29)
CREAT SERPL-MCNC: 0.7 MG/DL (ref 0.5–1.4)
DIFFERENTIAL METHOD: NORMAL
EOSINOPHIL # BLD AUTO: 0.1 K/UL (ref 0–0.5)
EOSINOPHIL NFR BLD: 1.5 % (ref 0–8)
ERYTHROCYTE [DISTWIDTH] IN BLOOD BY AUTOMATED COUNT: 12.7 % (ref 11.5–14.5)
EST. GFR  (NO RACE VARIABLE): >60 ML/MIN/1.73 M^2
GLUCOSE SERPL-MCNC: 129 MG/DL (ref 70–110)
HCT VFR BLD AUTO: 45 % (ref 37–48.5)
HGB BLD-MCNC: 15.7 G/DL (ref 12–16)
IMM GRANULOCYTES # BLD AUTO: 0.01 K/UL (ref 0–0.04)
IMM GRANULOCYTES NFR BLD AUTO: 0.1 % (ref 0–0.5)
LYMPHOCYTES # BLD AUTO: 1.8 K/UL (ref 1–4.8)
LYMPHOCYTES NFR BLD: 26.9 % (ref 18–48)
MAGNESIUM SERPL-MCNC: 2.3 MG/DL (ref 1.6–2.6)
MCH RBC QN AUTO: 30.3 PG (ref 27–31)
MCHC RBC AUTO-ENTMCNC: 34.9 G/DL (ref 32–36)
MCV RBC AUTO: 87 FL (ref 82–98)
MONOCYTES # BLD AUTO: 0.4 K/UL (ref 0.3–1)
MONOCYTES NFR BLD: 5.3 % (ref 4–15)
NEUTROPHILS # BLD AUTO: 4.5 K/UL (ref 1.8–7.7)
NEUTROPHILS NFR BLD: 65.2 % (ref 38–73)
NRBC BLD-RTO: 0 /100 WBC
PLATELET # BLD AUTO: 259 K/UL (ref 150–450)
PMV BLD AUTO: 11.2 FL (ref 9.2–12.9)
POTASSIUM SERPL-SCNC: 3.8 MMOL/L (ref 3.5–5.1)
PROT SERPL-MCNC: 8.1 G/DL (ref 6–8.4)
RBC # BLD AUTO: 5.19 M/UL (ref 4–5.4)
SODIUM SERPL-SCNC: 138 MMOL/L (ref 136–145)
WBC # BLD AUTO: 6.83 K/UL (ref 3.9–12.7)

## 2023-07-12 PROCEDURE — 36415 COLL VENOUS BLD VENIPUNCTURE: CPT | Performed by: CLINICAL NURSE SPECIALIST

## 2023-07-12 PROCEDURE — 63600175 PHARM REV CODE 636 W HCPCS: Performed by: CLINICAL NURSE SPECIALIST

## 2023-07-12 PROCEDURE — 85025 COMPLETE CBC W/AUTO DIFF WBC: CPT | Performed by: CLINICAL NURSE SPECIALIST

## 2023-07-12 PROCEDURE — 80053 COMPREHEN METABOLIC PANEL: CPT | Performed by: CLINICAL NURSE SPECIALIST

## 2023-07-12 PROCEDURE — 96374 THER/PROPH/DIAG INJ IV PUSH: CPT

## 2023-07-12 PROCEDURE — 25000003 PHARM REV CODE 250: Performed by: CLINICAL NURSE SPECIALIST

## 2023-07-12 PROCEDURE — 83735 ASSAY OF MAGNESIUM: CPT | Performed by: CLINICAL NURSE SPECIALIST

## 2023-07-12 PROCEDURE — 99284 EMERGENCY DEPT VISIT MOD MDM: CPT | Mod: 25

## 2023-07-12 PROCEDURE — 96361 HYDRATE IV INFUSION ADD-ON: CPT

## 2023-07-12 RX ORDER — ONDANSETRON 4 MG/1
4 TABLET, FILM COATED ORAL EVERY 6 HOURS
Qty: 10 TABLET | Refills: 0 | Status: SHIPPED | OUTPATIENT
Start: 2023-07-12 | End: 2023-11-16 | Stop reason: SDUPTHER

## 2023-07-12 RX ORDER — KETOROLAC TROMETHAMINE 30 MG/ML
15 INJECTION, SOLUTION INTRAMUSCULAR; INTRAVENOUS
Status: COMPLETED | OUTPATIENT
Start: 2023-07-12 | End: 2023-07-12

## 2023-07-12 RX ADMIN — KETOROLAC TROMETHAMINE 15 MG: 30 INJECTION, SOLUTION INTRAMUSCULAR; INTRAVENOUS at 01:07

## 2023-07-12 RX ADMIN — SODIUM CHLORIDE 1000 ML: 9 INJECTION, SOLUTION INTRAVENOUS at 01:07

## 2023-07-12 NOTE — ED PROVIDER NOTES
"Encounter Date: 2023       History     Chief Complaint   Patient presents with    Leg Problem     Numbness to right leg-chronic, denies trauma. Also reports vomiting this morning.      42-year-old female presents to the emergency room chronic right leg pain with numbness with vomiting since this morning.  Patient reports I think "I am dehydrated".  Patient has a history of anxiety, irregular heartbeat depression.  Ambulatory in triage      Review of patient's allergies indicates:  No Known Allergies  Past Medical History:   Diagnosis Date    Anxiety     Arrhythmia     "They say my heart skips a beat"    Depression      Past Surgical History:   Procedure Laterality Date     SECTION       Family History   Problem Relation Age of Onset    Cancer Mother      Social History     Tobacco Use    Smoking status: Every Day     Packs/day: 1.00     Types: Cigarettes    Smokeless tobacco: Never   Substance Use Topics    Alcohol use: Not Currently    Drug use: Yes     Types: Marijuana     Review of Systems   Constitutional:  Negative for fever.   HENT:  Negative for sore throat.    Respiratory:  Negative for shortness of breath.    Cardiovascular:  Negative for chest pain.   Gastrointestinal:  Positive for nausea and vomiting.   Genitourinary:  Negative for dysuria.   Musculoskeletal:  Positive for arthralgias and myalgias. Negative for back pain.   Skin:  Negative for rash.   Neurological:  Negative for weakness.   Hematological:  Does not bruise/bleed easily.   All other systems reviewed and are negative.    Physical Exam     Initial Vitals [23 1306]   BP Pulse Resp Temp SpO2   (!) 117/52 (!) 53 18 98 °F (36.7 °C) 98 %      MAP       --         Physical Exam    Nursing note and vitals reviewed.  Constitutional: She appears well-developed and well-nourished.   HENT:   Head: Normocephalic and atraumatic.   Eyes: Pupils are equal, round, and reactive to light.   Neck:   Normal range of motion.  Cardiovascular:  " Normal rate and regular rhythm.           Pulmonary/Chest: Breath sounds normal.   Abdominal: Abdomen is soft. Bowel sounds are normal.   Musculoskeletal:         General: Normal range of motion.      Cervical back: Normal range of motion.     Neurological: She is alert and oriented to person, place, and time.   Skin: Skin is warm and dry.   Psychiatric: She has a normal mood and affect.       ED Course   Procedures  Labs Reviewed   COMPREHENSIVE METABOLIC PANEL - Abnormal; Notable for the following components:       Result Value    Glucose 129 (*)     Anion Gap 5 (*)     All other components within normal limits   CBC W/ AUTO DIFFERENTIAL   MAGNESIUM          Imaging Results    None          Medications   sodium chloride 0.9% bolus 1,000 mL 1,000 mL (0 mLs Intravenous Stopped 7/12/23 1358)   ketorolac injection 15 mg (15 mg Intravenous Given 7/12/23 1320)     Medical Decision Making:   Differential Diagnosis:   Dehydration, viral syndrome, paresthesias, drug use  Clinical Tests:   Lab Tests: Ordered and Reviewed                        Clinical Impression:   Final diagnoses:  [R20.2] Paresthesia (Primary)  [B34.9] Viral illness        ED Disposition Condition    Discharge Stable          ED Prescriptions       Medication Sig Dispense Start Date End Date Auth. Provider    ondansetron (ZOFRAN) 4 MG tablet Take 1 tablet (4 mg total) by mouth every 6 (six) hours. 10 tablet 7/12/2023 -- Mone Farah NP          Follow-up Information       Follow up With Specialties Details Why Contact Info    [facility] Psychology, Internal Medicine, Gynecology, Dental General Practice  As needed [address] [facility] [address]  [phone]               Mone Farah NP  07/12/23 3144

## 2023-07-18 ENCOUNTER — PATIENT OUTREACH (OUTPATIENT)
Dept: EMERGENCY MEDICINE | Facility: HOSPITAL | Age: 43
End: 2023-07-18
Payer: MEDICAID

## 2023-07-18 NOTE — PROGRESS NOTES
Monica Cope, Patient Care Assistant  ED Navigator  Emergency Department    Project: [facility] ED Navigator  Role: Community Health Worker    Date: 07/18/2023  Patient Name: Anna Collette  MRN: [id number]  PCP: [facility]    Assessment:     Anna H Collette is a 42 y.o. female who has presented to ED for a leg problem. Patient has visited the ED 2 times in the past 3 months. Patient did contact PCP.     ED Navigator Initial Assessment    ED Navigator Enrollment Documentation  Consent to Services  Does patient consent to completing the assessment?: Yes  Contact  Method of Initial Contact: Phone  Transportation  Does the patient have issues with Transportation?: No  Does the patient have transportation to and from healthcare appointments?: Yes  Insurance Coverage  Do you have coverage/adequate coverage?: Yes  Type/kind of coverage: [facility]  Is patient able to afford co-pays/deductibles?: Yes  Is patient able to afford HME or supplies?: Yes  Does patient have an established [facility] PCP?: Yes  Able to access?: Yes  Does the patient have a lack of adequate coverage?: No  Specialist Appointment  Did the patient come to the ED to see a specialist?: No  Does the patient have a pending specialist referral?: No  Does the patient have a specialist appointment made?: No  PCP Follow Up Appointment  Has the patient had an appointment with a primary care provider in the past year?: No  Does the patient have a follow up appontment with a PCP?: No  When was the last time you saw your PCP?:  (Comment: Pt is unsure, but it has been over a year.)  Why does the patient not have a follow up scheduled?: Other (see comments) (Comment: Pt expressed she is waiting on a call back from PCP.)  Medications  Is patient able to afford medication?: Yes  Is patient unable to get medication due to lack of transportation?: No  Psychological  Does the patient have psycho-social concerns?: Yes  What concerns does  the patient have?: Anxiety and/or Depression (Comment: On medication.)  Food  Does the patient have concerns about food?: No  Communication/Education  Does the patient have limited English proficiency/English not primary language?: No  Does patient have low literacy and/or low health literacy?: Yes  Does patient have concerns with care?: No  Does patient have dissatisfaction with care?: No  Other Financial Concerns  Does the patient have immediate financial distress?: Yes  Does the patient have general financial concerns?: Yes  Other Social Barriers/Concerns  Does the patient have any additional barriers or concerns?: Unable to afford utilities, Work  Primary Barrier  Barriers identified: Psychological barrier (mistrust, anxiety, etc.), Cognitive barrier (health literacy, language and communication, etc.), Structural barrier (service availability, waiting times, etc.)  Root Cause of ED Utilization: Patient Knowledge/Low Health Literacy  Plan to address Patient Knowledge/Low Health Literacy: Provided information for [facility] On Call 24/7 Nurse triage line [phone] or [facility] ([phone])  Next steps: Provided Education  Was education/educational materials provided surrounding PCP services/creating a medical home?: Yes Was education verbal or written?: Verbal, Written     Was education/educational materials provided surrounding low cost, healthy foods?: Yes Was education verbal or written?: Written     Was education/educational materials provided surrounding other items? If so, use comment to explain.: Yes Was education verbal or written?: Written   Plan: Utility payment assistance resource given for their region  Expected Date of Follow Up 1: 7/28/23  Additional Documentation: Pt stated she is doing okay, but is really needing to see her PCP. Pt identified that she did reach out, but is waiting on a call back. Pt did not need helping finding any other specific doctors at this time, or scheduling any  other appts. Pt could use help with rent, utilities, and food, as she has been out of work, due to her condition. Pt provided [email or web address] as her e-mail address, and is agreeable to a follow-up call next week.    ED navigator ensured to assist pt with her needs. ED navigator ensured pt did not need assistance with appts. ED navigator provided patient with the following via e-mail: rent/utility assistance resources, a list of food foster for the area, the [employer] On Call 24/7 Nurse triage line, [phone] or 1-866-[facility] ([phone]) contact information, and education on (The Right Care at the Right Level information, [employer] Virtual Visit information, and Heart healthy diet tips). ED navigator will follow-up with patient on/around 7/28/2023 to see how she is doing, ensure she heard back from PCP and received resources, and to assist as needed.    Monica Cope  ED Navigator- [facility]  [phone]            Social History     Socioeconomic History    Marital status:    Tobacco Use    Smoking status: Every Day     Packs/day: 1.00     Types: Cigarettes    Smokeless tobacco: Never   Substance and Sexual Activity    Alcohol use: Not Currently    Drug use: Yes     Types: Marijuana     Social Determinants of Health     Financial Resource Strain: Medium Risk    Difficulty of Paying Living Expenses: Somewhat hard   Food Insecurity: Food Insecurity Present    Worried About Running Out of Food in the Last Year: Sometimes true    Ran Out of Food in the Last Year: Sometimes true   Transportation Needs: No Transportation Needs    Lack of Transportation (Medical): No    Lack of Transportation (Non-Medical): No   Physical Activity: Insufficiently Active    Days of Exercise per Week: 4 days    Minutes of Exercise per Session: 30 min   Stress: No Stress Concern Present    Feeling of Stress : Only a little   Social Connections: Socially Isolated    Frequency of Communication with Friends and  Family: More than three times a week    Frequency of Social Gatherings with Friends and Family: More than three times a week    Attends Yazidism Services: Never    Active Member of Clubs or Organizations: No    Attends Club or Organization Meetings: Never    Marital Status:    Housing Stability: Low Risk     Unable to Pay for Housing in the Last Year: No    Number of Places Lived in the Last Year: 1    Unstable Housing in the Last Year: No       Plan:       Pt stated she is doing okay, but is really needing to see her PCP. Pt identified that she did reach out, but is waiting on a call back. Pt did not need helping finding any other specific doctors at this time, or scheduling any other appts. Pt could use help with rent, utilities, and food, as she has been out of work, due to her condition. Pt provided [email or web address] as her e-mail address, and is agreeable to a follow-up call next week.    ED navigator ensured to assist pt with her needs. ED navigator ensured pt did not need assistance with appts. ED navigator provided patient with the following via e-mail: rent/utility assistance resources, a list of food foster for the area, the [facility] On [facility] 24/7 Nurse triage line, [phone] or [facility] ([phone]) contact information, and education on (The Right Care at the Right Level information, [facility] Virtual Visit information, and Heart healthy diet tips). ED navigator will follow-up with patient on/around 7/28/2023 to see how she is doing, ensure she heard back from PCP and received resources, and to assist as needed.    Monica Cope  ED Navigator- [place]  [phone]

## 2023-07-28 ENCOUNTER — PATIENT OUTREACH (OUTPATIENT)
Dept: EMERGENCY MEDICINE | Facility: HOSPITAL | Age: 43
End: 2023-07-28
Payer: MEDICAID

## 2023-07-28 NOTE — PROGRESS NOTES
Pt is unreachable for 1st F/U. ED navigator will follow-up with patient on/around 8/18/2023 for attempt at 2nd.    Monica Cope  ED Navigator- [place]  [phone]

## 2023-08-17 ENCOUNTER — HOSPITAL ENCOUNTER (EMERGENCY)
Facility: HOSPITAL | Age: 43
Discharge: HOME | End: 2023-08-17
Attending: EMERGENCY MEDICINE
Payer: MEDICAID

## 2023-08-17 VITALS
SYSTOLIC BLOOD PRESSURE: 112 MMHG | OXYGEN SATURATION: 99 % | WEIGHT: 82.38 LBS | TEMPERATURE: 98 F | BODY MASS INDEX: 14.6 KG/M2 | DIASTOLIC BLOOD PRESSURE: 66 MMHG | HEIGHT: 63 IN | HEART RATE: 64 BPM | RESPIRATION RATE: 18 BRPM

## 2023-08-17 DIAGNOSIS — F06.4 ANXIETY DISORDER DUE TO GENERAL MEDICAL CONDITION: Primary | ICD-10-CM

## 2023-08-17 DIAGNOSIS — R06.00 DYSPNEA, UNSPECIFIED TYPE: ICD-10-CM

## 2023-08-17 LAB
CTP QC/QA: YES
SARS-COV-2 RDRP RESP QL NAA+PROBE: NEGATIVE

## 2023-08-17 PROCEDURE — 63600175 PHARM REV CODE 636 W HCPCS: Performed by: EMERGENCY MEDICINE

## 2023-08-17 PROCEDURE — 96372 THER/PROPH/DIAG INJ SC/IM: CPT | Performed by: EMERGENCY MEDICINE

## 2023-08-17 PROCEDURE — 87635 SARS-COV-2 COVID-19 AMP PRB: CPT | Performed by: EMERGENCY MEDICINE

## 2023-08-17 PROCEDURE — 99284 EMERGENCY DEPT VISIT MOD MDM: CPT

## 2023-08-17 PROCEDURE — 25000003 PHARM REV CODE 250: Performed by: EMERGENCY MEDICINE

## 2023-08-17 RX ORDER — DIAZEPAM 5 MG/1
5 TABLET ORAL
Status: COMPLETED | OUTPATIENT
Start: 2023-08-17 | End: 2023-08-17

## 2023-08-17 RX ORDER — DEXAMETHASONE SODIUM PHOSPHATE 4 MG/ML
8 INJECTION, SOLUTION INTRA-ARTICULAR; INTRALESIONAL; INTRAMUSCULAR; INTRAVENOUS; SOFT TISSUE
Status: COMPLETED | OUTPATIENT
Start: 2023-08-17 | End: 2023-08-17

## 2023-08-17 RX ADMIN — DEXAMETHASONE SODIUM PHOSPHATE 8 MG: 4 INJECTION, SOLUTION INTRA-ARTICULAR; INTRALESIONAL; INTRAMUSCULAR; INTRAVENOUS; SOFT TISSUE at 09:08

## 2023-08-17 RX ADMIN — DIAZEPAM 5 MG: 5 TABLET ORAL at 09:08

## 2023-08-17 NOTE — Clinical Note
"Kelly Christensen" Collette was seen and treated in our emergency department on 8/17/2023.  She may return to work on 08/19/2023.       If you have any questions or concerns, please don't hesitate to call.      Theresa Sabillon RN    "

## 2023-08-17 NOTE — ED PROVIDER NOTES
"Encounter Date: 2023       History     Chief Complaint   Patient presents with    Shortness of Breath     Pt reports she has been having SOB for 3 days now and her hands are swollen, and right leg is hurting her.     42-year-old female with a history of anxiety presents the emergency department with "shortness of breath, sore throat, myalgia, right leg giving out, swollen hands".  A multitude of complaints.  Oxygen saturation 99% on room air, she is a smoker, afebrile.  Denies chest pain.  Not ill appearing, alert orient x4, GCS is 15.  Patient is tearful, appears anxious      Review of patient's allergies indicates:  No Known Allergies  Past Medical History:   Diagnosis Date    Anxiety     Arrhythmia     "They say my heart skips a beat"    Depression      Past Surgical History:   Procedure Laterality Date     SECTION       Family History   Problem Relation Age of Onset    Cancer Mother      Social History     Tobacco Use    Smoking status: Every Day     Current packs/day: 1.00     Types: Cigarettes    Smokeless tobacco: Never   Substance Use Topics    Alcohol use: Not Currently    Drug use: Yes     Types: Marijuana     Review of Systems   Constitutional:  Negative for fever.   HENT:  Positive for sore throat.    Respiratory:  Positive for shortness of breath.    Cardiovascular:  Negative for chest pain.   Gastrointestinal:  Negative for nausea.   Genitourinary:  Negative for dysuria.   Musculoskeletal:  Negative for back pain.   Skin:  Negative for rash.   Neurological:  Negative for weakness.   Hematological:  Does not bruise/bleed easily.   Psychiatric/Behavioral:  The patient is nervous/anxious.    All other systems reviewed and are negative.      Physical Exam     Initial Vitals [23 0906]   BP Pulse Resp Temp SpO2   103/61 (!) 59 20 98.2 °F (36.8 °C) 98 %      MAP       --         Physical Exam    Nursing note and vitals reviewed.  Constitutional: She appears well-developed and " well-nourished. She is not diaphoretic. No distress.   HENT:   Head: Normocephalic and atraumatic.   Mouth/Throat: Oropharynx is clear and moist.   Eyes: Conjunctivae and EOM are normal. Pupils are equal, round, and reactive to light.   Neck: Neck supple. No tracheal deviation present. No JVD present.   Normal range of motion.  Cardiovascular:  Normal rate, regular rhythm, normal heart sounds and intact distal pulses.           No murmur heard.  Pulmonary/Chest: Breath sounds normal. No stridor. No respiratory distress. She has no wheezes. She has no rhonchi. She has no rales. She exhibits no tenderness.   Abdominal: Abdomen is soft. Bowel sounds are normal. She exhibits no distension. There is no abdominal tenderness. There is no rebound and no guarding.   Musculoskeletal:         General: No tenderness or edema. Normal range of motion.      Cervical back: Normal range of motion and neck supple.     Neurological: She is alert and oriented to person, place, and time. She has normal strength. No cranial nerve deficit. GCS score is 15. GCS eye subscore is 4. GCS verbal subscore is 5. GCS motor subscore is 6.   Skin: Skin is warm and dry. Capillary refill takes less than 2 seconds.   Psychiatric:   Anxious female         ED Course   Procedures  Labs Reviewed   SARS-COV-2 RDRP GENE    Narrative:     ..This test utilizes isothermal nucleic acid amplification technology to detect the SARS-CoV-2 RdRp nucleic acid segment. The analytical sensitivity (limit of detection) is 500 copies/swab.     A POSITIVE result is indicative of the presence of SARS-CoV-2 RNA; clinical correlation with patient history and other diagnostic information is necessary to determine patient infection status.    A NEGATIVE result means that SARS-CoV-2 nucleic acids are not present above the limit of detection. A NEGATIVE result should be treated as presumptive. It does not rule out the possibility of COVID-19 and should not be the sole basis for  treatment decisions. If COVID-19 is strongly suspected based on clinical and exposure history, re-testing using an alternate molecular assay should be considered.     This test is only for use under the Food and Drug Administration s Emergency Use Authorization (EUA).     Commercial kits are provided by [employer]. Performance characteristics of the EUA have been independently verified by [facility] Department of Pathology and Laboratory Medicine.   _________________________________________________________________   The authorized Fact Sheet for Healthcare Providers and the authorized Fact Sheet for Patients of the ID NOW COVID-19 are available on the FDA website:    https://www.fda.gov/media/441567/download      https://www.fda.gov/media/366784/download              Imaging Results    None          Medications   dexAMETHasone injection 8 mg (8 mg Intramuscular Given 8/17/23 0917)   diazePAM tablet 5 mg (5 mg Oral Given 8/17/23 0919)     Medical Decision Making:   Differential Diagnosis:   Anxiety, viral syndrome, tobacco abuse             ED Course as of 08/17/23 0935   Thu Aug 17, 2023   0934 Patient with severe anxiety.  She is very anxious here in the emergency department, oxygen saturation is 99% on room air, discussed the need to stop smoking.  She is stable for discharge and follow-up. [SD]      ED Course User Index  [SD] Simeon Palacios MD                 Clinical Impression:   Final diagnoses:  [R06.00] Dyspnea, unspecified type  [F06.4] Anxiety disorder due to general medical condition (Primary)        ED Disposition Condition    Discharge Stable          ED Prescriptions    None       Follow-up Information       Follow up With Specialties Details Why Contact Info Additional Information    Primary care physician  In 2 days       [place] - Emergency Department Emergency Medicine  As needed, If symptoms worsen [address] [place] [id number] [phone] Floor 1              Simeon Palacios MD  08/17/23 0960

## 2023-08-18 ENCOUNTER — PATIENT OUTREACH (OUTPATIENT)
Dept: EMERGENCY MEDICINE | Facility: HOSPITAL | Age: 43
End: 2023-08-18
Payer: MEDICAID

## 2023-08-18 NOTE — PROGRESS NOTES
Pt is unreachable for 2nd F/U. ED navigator will follow-up with patient on/around 9/13/2023 for attempt at 3rd.    Monica Cope  ED Navigator- [place]  [phone]

## 2023-09-11 ENCOUNTER — HOSPITAL ENCOUNTER (EMERGENCY)
Facility: HOSPITAL | Age: 43
Discharge: HOME | End: 2023-09-11
Attending: EMERGENCY MEDICINE
Payer: MEDICAID

## 2023-09-11 VITALS
SYSTOLIC BLOOD PRESSURE: 102 MMHG | HEIGHT: 62 IN | BODY MASS INDEX: 15.09 KG/M2 | RESPIRATION RATE: 20 BRPM | OXYGEN SATURATION: 98 % | DIASTOLIC BLOOD PRESSURE: 75 MMHG | HEART RATE: 70 BPM | TEMPERATURE: 98 F | WEIGHT: 82 LBS

## 2023-09-11 DIAGNOSIS — L84 CORN OF FOOT: Primary | ICD-10-CM

## 2023-09-11 PROCEDURE — 99283 EMERGENCY DEPT VISIT LOW MDM: CPT

## 2023-09-11 RX ORDER — DICLOFENAC SODIUM 75 MG/1
75 TABLET, DELAYED RELEASE ORAL 2 TIMES DAILY
Qty: 10 TABLET | Refills: 0 | Status: SHIPPED | OUTPATIENT
Start: 2023-09-11 | End: 2023-09-16

## 2023-09-11 NOTE — DISCHARGE INSTRUCTIONS
You may want to purchase over-the-counter shoe inserts for added comfort.  Take medication as directed.  Follow-up with the podiatrist for further evaluation and management of your symptoms.

## 2023-09-11 NOTE — ED PROVIDER NOTES
"Encounter Date: 2023       History     Chief Complaint   Patient presents with    Foot Pain     Pt c/o L foot pain to soles of foot since 3 days. Denies injury. Pt reports has been working on feet.     This is a 42-year-old white female with history of anxiety who presents to the emergency department with complaints of pain to the plantar surface of the left foot "for awhile" due to thickening skin.  She states pain was worse today and she felt as though she was unable to ambulate at work due to the pain.  She denies recent injury, fever, or any other relative symptoms at this time.  She has not tried any over-the-counter products for her symptoms.      Review of patient's allergies indicates:  No Known Allergies  Past Medical History:   Diagnosis Date    Anxiety     Arrhythmia     "They say my heart skips a beat"    Depression      Past Surgical History:   Procedure Laterality Date     SECTION       Family History   Problem Relation Age of Onset    Cancer Mother      Social History     Tobacco Use    Smoking status: Every Day     Current packs/day: 1.00     Types: Cigarettes    Smokeless tobacco: Never   Substance Use Topics    Alcohol use: Not Currently    Drug use: Yes     Types: Marijuana     Review of Systems   Musculoskeletal:  Positive for gait problem.   Skin:  Positive for color change.       Physical Exam     Initial Vitals [23 0940]   BP Pulse Resp Temp SpO2   102/75 70 20 97.9 °F (36.6 °C) 98 %      MAP       --         Physical Exam    Nursing note and vitals reviewed.  Constitutional: She appears well-developed and well-nourished. She is active. No distress.   HENT:   Head: Normocephalic and atraumatic.   Eyes: EOM are normal. Pupils are equal, round, and reactive to light.   Neck: Neck supple.   Normal range of motion.  Cardiovascular:  Normal rate, regular rhythm and normal heart sounds.           Musculoskeletal:      Cervical back: Normal range of motion and neck supple.        " Feet:      Neurological: She is alert and oriented to person, place, and time. GCS eye subscore is 4. GCS verbal subscore is 5. GCS motor subscore is 6.   Skin: Skin is warm and dry. Capillary refill takes less than 2 seconds.   There are what appear to be to corns present on the plantar surface of the left foot with tenderness to palpation.  No infection or drainage.   Psychiatric: She has a normal mood and affect. Her behavior is normal. Thought content normal.         ED Course   Procedures  Labs Reviewed - No data to display       Imaging Results    None          Medications - No data to display  Medical Decision Making  Risk  Prescription drug management.                               Clinical Impression:   Final diagnoses:  [L84] Corn of foot (Primary)        ED Disposition Condition    Discharge Stable          ED Prescriptions       Medication Sig Dispense Start Date End Date Auth. Provider    diclofenac (VOLTAREN) 75 MG EC tablet Take 1 tablet (75 mg total) by mouth 2 (two) times daily. for 5 days 10 tablet 9/11/2023 9/16/2023 Mara Hamilton NP          Follow-up Information       Follow up With Specialties Details Why Contact Info    Robin Escalante DPM Podiatry Call today for re-evaluation of today's complaint [address]  [facility] [id number] [phone]               Mara Hamilton NP  09/11/23 4131

## 2023-09-11 NOTE — Clinical Note
"Kelly Peck (Anna)tte was seen and treated in our emergency department on 9/11/2023.  She may return to work on 09/12/2023.       If you have any questions or concerns, please don't hesitate to call.      Mara Hamilton NP"

## 2023-09-13 ENCOUNTER — PATIENT OUTREACH (OUTPATIENT)
Dept: EMERGENCY MEDICINE | Facility: HOSPITAL | Age: 43
End: 2023-09-13
Payer: MEDICAID

## 2023-09-13 NOTE — PROGRESS NOTES
Pt is unreachable for 3rd F/U. ED navigator will follow-up with patient on/around 10/18/2023 for attempt at additional.     Monica Cope  ED Navigator- [place]  [phone]

## 2023-09-16 ENCOUNTER — HOSPITAL ENCOUNTER (EMERGENCY)
Facility: HOSPITAL | Age: 43
Discharge: HOME | End: 2023-09-16
Attending: EMERGENCY MEDICINE
Payer: MEDICAID

## 2023-09-16 VITALS
BODY MASS INDEX: 15.09 KG/M2 | OXYGEN SATURATION: 98 % | WEIGHT: 82 LBS | SYSTOLIC BLOOD PRESSURE: 149 MMHG | HEART RATE: 56 BPM | TEMPERATURE: 99 F | DIASTOLIC BLOOD PRESSURE: 97 MMHG | RESPIRATION RATE: 16 BRPM | HEIGHT: 62 IN

## 2023-09-16 DIAGNOSIS — R11.0 NAUSEA: Primary | ICD-10-CM

## 2023-09-16 PROCEDURE — 63600175 PHARM REV CODE 636 W HCPCS: Performed by: EMERGENCY MEDICINE

## 2023-09-16 PROCEDURE — 99284 EMERGENCY DEPT VISIT MOD MDM: CPT

## 2023-09-16 PROCEDURE — 96372 THER/PROPH/DIAG INJ SC/IM: CPT | Performed by: EMERGENCY MEDICINE

## 2023-09-16 RX ORDER — ONDANSETRON 4 MG/1
4 TABLET, ORALLY DISINTEGRATING ORAL EVERY 8 HOURS PRN
Qty: 12 TABLET | Refills: 0 | Status: SHIPPED | OUTPATIENT
Start: 2023-09-16 | End: 2023-10-24

## 2023-09-16 RX ORDER — PROMETHAZINE HYDROCHLORIDE 25 MG/ML
25 INJECTION, SOLUTION INTRAMUSCULAR; INTRAVENOUS
Status: COMPLETED | OUTPATIENT
Start: 2023-09-16 | End: 2023-09-16

## 2023-09-16 RX ADMIN — PROMETHAZINE HYDROCHLORIDE 25 MG: 25 INJECTION INTRAMUSCULAR; INTRAVENOUS at 08:09

## 2023-09-16 NOTE — ED PROVIDER NOTES
"Encounter Date: 2023       History     Chief Complaint   Patient presents with    Emesis     Pt reports that she has been vomiting since this morning and has diarrhea.      42-year-old female with history of severe anxiety presents to the emergency department with dry heaves that began this morning.  No sick contacts.  No fever.  No abdominal pain.  History of this multiple times in the past.  Alert oriented x3, GCS is 15.  She is not ill appearing.  No vomit in the emesis bag, just dry heaving.      Review of patient's allergies indicates:  No Known Allergies  Past Medical History:   Diagnosis Date    Anxiety     Arrhythmia     "They say my heart skips a beat"    Depression      Past Surgical History:   Procedure Laterality Date     SECTION       Family History   Problem Relation Age of Onset    Cancer Mother      Social History     Tobacco Use    Smoking status: Every Day     Current packs/day: 1.00     Types: Cigarettes    Smokeless tobacco: Never   Substance Use Topics    Alcohol use: Not Currently    Drug use: Yes     Types: Marijuana     Review of Systems   Constitutional:  Negative for fever.   HENT:  Negative for sore throat.    Respiratory:  Negative for shortness of breath.    Cardiovascular:  Negative for chest pain.   Gastrointestinal:  Positive for nausea.   Genitourinary:  Negative for dysuria.   Musculoskeletal:  Negative for back pain.   Skin:  Negative for rash.   Neurological:  Negative for weakness.   Hematological:  Does not bruise/bleed easily.   All other systems reviewed and are negative.      Physical Exam     Initial Vitals [23 0834]   BP Pulse Resp Temp SpO2   (!) 149/97 (!) 56 16 98.7 °F (37.1 °C) 98 %      MAP       --         Physical Exam    Nursing note and vitals reviewed.  Constitutional: She appears well-developed and well-nourished. She is not diaphoretic. No distress.   HENT:   Head: Normocephalic and atraumatic.   Eyes: Conjunctivae and EOM are normal. Pupils " are equal, round, and reactive to light.   Neck: Neck supple.   Normal range of motion.  Cardiovascular:  Normal rate, regular rhythm, normal heart sounds and intact distal pulses.           No murmur heard.  Pulmonary/Chest: Breath sounds normal. No respiratory distress. She has no wheezes. She has no rhonchi. She has no rales. She exhibits no tenderness.   Abdominal: Abdomen is soft. Bowel sounds are normal. She exhibits no distension. There is no abdominal tenderness. There is no rebound and no guarding.   Musculoskeletal:         General: No tenderness or edema. Normal range of motion.      Cervical back: Normal range of motion and neck supple.     Neurological: She is alert and oriented to person, place, and time. She has normal strength. No cranial nerve deficit. GCS score is 15. GCS eye subscore is 4. GCS verbal subscore is 5. GCS motor subscore is 6.   Skin: Skin is warm and dry. Capillary refill takes less than 2 seconds.         ED Course   Procedures  Labs Reviewed - No data to display       Imaging Results    None          Medications   promethazine injection 25 mg (has no administration in time range)     Medical Decision Making  Risk  Prescription drug management.                          Medical Decision Making:   Differential Diagnosis:   Nausea vomiting, gastroenteritis, anxiety      Clinical Impression:   Final diagnoses:  [R11.0] Nausea (Primary)        ED Disposition Condition    Discharge Stable          ED Prescriptions       Medication Sig Dispense Start Date End Date Auth. Provider    ondansetron (ZOFRAN-ODT) 4 MG TbDL Take 1 tablet (4 mg total) by mouth every 8 (eight) hours as needed (Nausea and vomiting). 12 tablet 9/16/2023 -- Simeon Palacios MD          Follow-up Information       Follow up With Specialties Details Why Contact Info Additional Information    Primary care physician  In 2 days       [place] - Emergency Department Emergency Medicine  If symptoms worsen [facility] [address]  [facility] [id number]  [phone] Floor 1             Simeon Palacios MD  09/16/23 0887

## 2023-09-16 NOTE — Clinical Note
"Kelly Christensen" Collette was seen and treated in our emergency department on 9/16/2023.  She may return to work on 09/16/2023.       If you have any questions or concerns, please don't hesitate to call.      Simeon Palacios MD"

## 2023-10-10 ENCOUNTER — HOSPITAL ENCOUNTER (EMERGENCY)
Facility: HOSPITAL | Age: 43
Discharge: HOME | End: 2023-10-10
Attending: EMERGENCY MEDICINE
Payer: MEDICAID

## 2023-10-10 VITALS
HEART RATE: 64 BPM | HEIGHT: 62 IN | TEMPERATURE: 98 F | SYSTOLIC BLOOD PRESSURE: 98 MMHG | WEIGHT: 81.38 LBS | DIASTOLIC BLOOD PRESSURE: 67 MMHG | OXYGEN SATURATION: 99 % | RESPIRATION RATE: 18 BRPM | BODY MASS INDEX: 14.97 KG/M2

## 2023-10-10 DIAGNOSIS — R11.2 NAUSEA AND VOMITING, UNSPECIFIED VOMITING TYPE: Primary | ICD-10-CM

## 2023-10-10 LAB
CTP QC/QA: YES
SARS-COV-2 RDRP RESP QL NAA+PROBE: NEGATIVE

## 2023-10-10 PROCEDURE — 99284 EMERGENCY DEPT VISIT MOD MDM: CPT

## 2023-10-10 PROCEDURE — 87635 SARS-COV-2 COVID-19 AMP PRB: CPT | Performed by: EMERGENCY MEDICINE

## 2023-10-10 PROCEDURE — 96372 THER/PROPH/DIAG INJ SC/IM: CPT | Performed by: EMERGENCY MEDICINE

## 2023-10-10 PROCEDURE — 63600175 PHARM REV CODE 636 W HCPCS: Performed by: EMERGENCY MEDICINE

## 2023-10-10 RX ORDER — ONDANSETRON 2 MG/ML
4 INJECTION INTRAMUSCULAR; INTRAVENOUS
Status: COMPLETED | OUTPATIENT
Start: 2023-10-10 | End: 2023-10-10

## 2023-10-10 RX ORDER — ONDANSETRON 4 MG/1
4 TABLET, ORALLY DISINTEGRATING ORAL EVERY 8 HOURS PRN
Qty: 21 TABLET | Refills: 0 | Status: SHIPPED | OUTPATIENT
Start: 2023-10-10 | End: 2023-10-24

## 2023-10-10 RX ADMIN — ONDANSETRON 4 MG: 2 INJECTION INTRAMUSCULAR; INTRAVENOUS at 06:10

## 2023-10-10 NOTE — ED PROVIDER NOTES
"Encounter Date: 10/10/2023       History     Chief Complaint   Patient presents with    Emesis     Vomiting x 2 days. Denies diarrhea, fever, abd pain.      43-year-old female history of anxiety, depression presents the emergency department with nausea and vomiting for the past 2 days.  History of this multiple times in the past.  Relieved with Zofran.  But she is out of her Zofran.  Complaining of generalized abdominal pain.  Center the same when she always has these flare-ups.  Appointment with her primary care physician in the near future for recheck.  Denies any fever.  Not ill appearing, alert oriented x4, GCS is 15      Review of patient's allergies indicates:  No Known Allergies  Past Medical History:   Diagnosis Date    Anxiety     Arrhythmia     "They say my heart skips a beat"    Depression      Past Surgical History:   Procedure Laterality Date     SECTION       Family History   Problem Relation Age of Onset    Cancer Mother      Social History     Tobacco Use    Smoking status: Every Day     Current packs/day: 1.00     Types: Cigarettes    Smokeless tobacco: Never   Substance Use Topics    Alcohol use: Not Currently    Drug use: Yes     Types: Marijuana     Review of Systems   Constitutional:  Negative for fever.   HENT:  Negative for sore throat.    Respiratory:  Negative for shortness of breath.    Cardiovascular:  Negative for chest pain.   Gastrointestinal:  Positive for nausea.   Genitourinary:  Negative for dysuria.   Musculoskeletal:  Negative for back pain.   Skin:  Negative for rash.   Neurological:  Negative for weakness.   Hematological:  Does not bruise/bleed easily.   All other systems reviewed and are negative.      Physical Exam     Initial Vitals [10/10/23 0609]   BP Pulse Resp Temp SpO2   98/67 64 18 97.9 °F (36.6 °C) 99 %      MAP       --         Physical Exam    Nursing note and vitals reviewed.  Constitutional: She appears well-developed and well-nourished. She is not " diaphoretic. No distress.   HENT:   Head: Normocephalic and atraumatic.   Mouth/Throat: Oropharynx is clear and moist.   Eyes: Conjunctivae and EOM are normal. Pupils are equal, round, and reactive to light.   Neck: Neck supple. No tracheal deviation present. No JVD present.   Normal range of motion.  Cardiovascular:  Normal rate, regular rhythm, normal heart sounds and intact distal pulses.           No murmur heard.  Pulmonary/Chest: Breath sounds normal. No stridor. No respiratory distress. She has no wheezes. She has no rhonchi. She has no rales. She exhibits no tenderness.   Abdominal: Abdomen is soft. Bowel sounds are normal. She exhibits no distension and no mass. There is no abdominal tenderness. There is no rebound and no guarding.   Musculoskeletal:         General: No tenderness or edema. Normal range of motion.      Cervical back: Normal range of motion and neck supple.     Neurological: She is alert and oriented to person, place, and time. She has normal strength. No cranial nerve deficit. GCS score is 15. GCS eye subscore is 4. GCS verbal subscore is 5. GCS motor subscore is 6.   Skin: Skin is warm and dry. Capillary refill takes less than 2 seconds.         ED Course   Procedures  Labs Reviewed   SARS-COV-2 RDRP GENE    Narrative:     .r          Imaging Results    None          Medications   ondansetron injection 4 mg (4 mg Intramuscular Given 10/10/23 0621)     Medical Decision Making  Amount and/or Complexity of Data Reviewed  Labs: ordered.    Risk  Prescription drug management.                          Medical Decision Making:   Differential Diagnosis:   Viral syndrome, nausea vomiting.  Cyclical vomiting syndrome      Clinical Impression:   Final diagnoses:  [R11.2] Nausea and vomiting, unspecified vomiting type (Primary)        ED Disposition Condition    Discharge Stable          ED Prescriptions       Medication Sig Dispense Start Date End Date Auth. Provider    ondansetron (ZOFRAN-ODT) 4 MG  TbDL Take 1 tablet (4 mg total) by mouth every 8 (eight) hours as needed (Nausea and vomiting). 21 tablet 10/10/2023 -- Simeon Palacios MD          Follow-up Information       Follow up With Specialties Details Why Contact Info Additional Information    Primary care physician  In 2 days       [place] - Emergency Department Emergency Medicine  As needed [facility] [facility] [address]  [phone] Floor 1             Simeon Palacios MD  10/10/23 0633       Simeon Palacios MD  10/10/23 0638

## 2023-10-10 NOTE — Clinical Note
"Kelly Chirstensen" Collette was seen and treated in our emergency department on 10/10/2023.  She may return to work on 10/11/2023.       If you have any questions or concerns, please don't hesitate to call.      Simeon Palacios MD"

## 2023-10-10 NOTE — Clinical Note
"Kelly Christensen" Collette was seen and treated in our emergency department on 10/10/2023.  She may return to work on 10/11/2023.       If you have any questions or concerns, please don't hesitate to call.      Simeon Palacios MD"

## 2023-10-18 ENCOUNTER — PATIENT OUTREACH (OUTPATIENT)
Dept: EMERGENCY MEDICINE | Facility: HOSPITAL | Age: 43
End: 2023-10-18
Payer: MEDICAID

## 2023-10-18 NOTE — PROGRESS NOTES
Pt is unreachable. Encounter will be closed, and pt should be contacted if/when he/she comes back to the ER again for a F/U call.    Monica Cope  ED Navigator- [place]  [phone]

## 2023-10-24 ENCOUNTER — HOSPITAL ENCOUNTER (EMERGENCY)
Facility: HOSPITAL | Age: 43
Discharge: HOME | End: 2023-10-24
Attending: EMERGENCY MEDICINE
Payer: MEDICAID

## 2023-10-24 VITALS
HEART RATE: 81 BPM | DIASTOLIC BLOOD PRESSURE: 72 MMHG | TEMPERATURE: 99 F | BODY MASS INDEX: 12.77 KG/M2 | WEIGHT: 81.38 LBS | SYSTOLIC BLOOD PRESSURE: 103 MMHG | HEIGHT: 67 IN | OXYGEN SATURATION: 98 % | RESPIRATION RATE: 18 BRPM

## 2023-10-24 DIAGNOSIS — J06.9 UPPER RESPIRATORY TRACT INFECTION, UNSPECIFIED TYPE: Primary | ICD-10-CM

## 2023-10-24 DIAGNOSIS — R11.10 VOMITING AND DIARRHEA: ICD-10-CM

## 2023-10-24 DIAGNOSIS — N39.0 URINARY TRACT INFECTION WITHOUT HEMATURIA, SITE UNSPECIFIED: ICD-10-CM

## 2023-10-24 DIAGNOSIS — R19.7 VOMITING AND DIARRHEA: ICD-10-CM

## 2023-10-24 LAB
AMORPH CRY UR QL COMP ASSIST: ABNORMAL
BACTERIA #/AREA URNS HPF: ABNORMAL /HPF
BILIRUB UR QL STRIP: NEGATIVE
CLARITY UR: ABNORMAL
COLOR UR: YELLOW
CTP QC/QA: YES
CTP QC/QA: YES
GLUCOSE UR QL STRIP: NEGATIVE
HGB UR QL STRIP: NEGATIVE
HYALINE CASTS #/AREA URNS LPF: 0 /LPF
KETONES UR QL STRIP: NEGATIVE
LEUKOCYTE ESTERASE UR QL STRIP: NEGATIVE
MICROSCOPIC COMMENT: ABNORMAL
NITRITE UR QL STRIP: POSITIVE
PH UR STRIP: 8 [PH] (ref 5–8)
POC MOLECULAR INFLUENZA A AGN: NEGATIVE
POC MOLECULAR INFLUENZA B AGN: NEGATIVE
PROT UR QL STRIP: NEGATIVE
RBC #/AREA URNS HPF: 2 /HPF (ref 0–4)
SARS-COV-2 RDRP RESP QL NAA+PROBE: NEGATIVE
SP GR UR STRIP: 1.01 (ref 1–1.03)
SQUAMOUS #/AREA URNS HPF: 4 /HPF
URN SPEC COLLECT METH UR: ABNORMAL
UROBILINOGEN UR STRIP-ACNC: 1 EU/DL
WBC #/AREA URNS HPF: 58 /HPF (ref 0–5)

## 2023-10-24 PROCEDURE — 25000003 PHARM REV CODE 250: Performed by: NURSE PRACTITIONER

## 2023-10-24 PROCEDURE — 87635 SARS-COV-2 COVID-19 AMP PRB: CPT | Performed by: NURSE PRACTITIONER

## 2023-10-24 PROCEDURE — 87502 INFLUENZA DNA AMP PROBE: CPT

## 2023-10-24 PROCEDURE — 87086 URINE CULTURE/COLONY COUNT: CPT | Performed by: NURSE PRACTITIONER

## 2023-10-24 PROCEDURE — 81000 URINALYSIS NONAUTO W/SCOPE: CPT | Performed by: NURSE PRACTITIONER

## 2023-10-24 PROCEDURE — 99284 EMERGENCY DEPT VISIT MOD MDM: CPT

## 2023-10-24 RX ORDER — NITROFURANTOIN 25; 75 MG/1; MG/1
100 CAPSULE ORAL 2 TIMES DAILY
Qty: 14 CAPSULE | Refills: 0 | Status: SHIPPED | OUTPATIENT
Start: 2023-10-24 | End: 2023-10-31

## 2023-10-24 RX ORDER — IBUPROFEN 600 MG/1
600 TABLET ORAL
Status: COMPLETED | OUTPATIENT
Start: 2023-10-24 | End: 2023-10-24

## 2023-10-24 RX ORDER — PROMETHAZINE HYDROCHLORIDE AND DEXTROMETHORPHAN HYDROBROMIDE 6.25; 15 MG/5ML; MG/5ML
5 SYRUP ORAL EVERY 4 HOURS PRN
Qty: 118 ML | Refills: 0 | Status: SHIPPED | OUTPATIENT
Start: 2023-10-24 | End: 2023-11-03

## 2023-10-24 RX ADMIN — IBUPROFEN 600 MG: 600 TABLET ORAL at 09:10

## 2023-10-24 NOTE — ED PROVIDER NOTES
"Encounter Date: 10/24/2023       History     Chief Complaint   Patient presents with    Nasal Congestion     Congestion, vomiting, cough, sore throat x 4 days. Denies fever, diarrhea.      This is a 43-year-old white female with a history of anxiety and depression who presents to the emergency department with complaints of URI signs and symptoms over the last 4 days, characterized by nasal congestion, sore throat, nonproductive cough, and occasional vomiting/diarrhea.  She also reports foul odor to urine.  She denies known fever.  Her son was experiencing similar symptoms last week.      Review of patient's allergies indicates:  No Known Allergies  Past Medical History:   Diagnosis Date    Anxiety     Arrhythmia     "They say my heart skips a beat"    Depression      Past Surgical History:   Procedure Laterality Date     SECTION       Family History   Problem Relation Age of Onset    Cancer Mother      Social History     Tobacco Use    Smoking status: Every Day     Current packs/day: 1.00     Types: Cigarettes    Smokeless tobacco: Never   Substance Use Topics    Alcohol use: Not Currently    Drug use: Yes     Types: Marijuana     Review of Systems   Constitutional:  Positive for appetite change. Negative for fever.   HENT:  Positive for congestion, rhinorrhea, sinus pressure and sore throat.    Respiratory:  Positive for cough.    Gastrointestinal:  Positive for abdominal pain (mild, generalized), diarrhea and vomiting.   Genitourinary:  Negative for dysuria.   Musculoskeletal:  Positive for myalgias.       Physical Exam     Initial Vitals [10/24/23 0932]   BP Pulse Resp Temp SpO2   103/72 81 18 98.7 °F (37.1 °C) 98 %      MAP       --         Physical Exam    Nursing note and vitals reviewed.  Constitutional: She appears well-developed and well-nourished. She is active. No distress.   HENT:   Head: Normocephalic and atraumatic.   Mouth/Throat: Oropharynx is clear and moist.   Eyes: EOM are normal. Pupils " are equal, round, and reactive to light.   Neck: Neck supple.   Normal range of motion.  Cardiovascular:  Normal rate, regular rhythm and normal heart sounds.           Pulmonary/Chest: Breath sounds normal. No respiratory distress.   Abdominal: Abdomen is soft. Bowel sounds are normal. She exhibits no distension. There is no abdominal tenderness.   Musculoskeletal:         General: Normal range of motion.      Cervical back: Normal range of motion and neck supple.     Neurological: She is alert and oriented to person, place, and time. GCS score is 15. GCS eye subscore is 4. GCS verbal subscore is 5. GCS motor subscore is 6.   Skin: Skin is warm and dry. Capillary refill takes less than 2 seconds.   Psychiatric: She has a normal mood and affect. Her behavior is normal. Thought content normal.         ED Course   Procedures  Labs Reviewed   URINALYSIS, REFLEX TO URINE CULTURE - Abnormal; Notable for the following components:       Result Value    Appearance, UA Cloudy (*)     Nitrite, UA Positive (*)     All other components within normal limits    Narrative:     Preferred Collection Type->Urine, Clean Catch  Specimen Source->Urine   URINALYSIS MICROSCOPIC - Abnormal; Notable for the following components:    WBC, UA 58 (*)     Bacteria Many (*)     Amorphous, UA Many (*)     All other components within normal limits    Narrative:     Preferred Collection Type->Urine, Clean Catch  Specimen Source->Urine   CULTURE, URINE   SARS-COV-2 RDRP GENE    Narrative:     This test utilizes isothermal nucleic acid amplification technology to detect the SARS-CoV-2 RdRp nucleic acid segment. The analytical sensitivity (limit of detection) is 500 copies/swab.     A POSITIVE result is indicative of the presence of SARS-CoV-2 RNA; clinical correlation with patient history and other diagnostic information is necessary to determine patient infection status.    A NEGATIVE result means that SARS-CoV-2 nucleic acids are not present above  the limit of detection. A NEGATIVE result should be treated as presumptive. It does not rule out the possibility of COVID-19 and should not be the sole basis for treatment decisions. If COVID-19 is strongly suspected based on clinical and exposure history, re-testing using an alternate molecular assay should be considered.     This test is only for use under the Food and Drug Administration s Emergency Use Authorization (EUA).     Commercial kits are provided by [employer]. Performance characteristics of the EUA have been independently verified by [facility] Department of Pathology and Laboratory Medicine.   _________________________________________________________________   The authorized Fact Sheet for Healthcare Providers and the authorized Fact Sheet for Patients of the ID NOW COVID-19 are available on the FDA website:    https://www.fda.gov/media/477262/download      https://www.fda.gov/media/960153/download      POCT INFLUENZA A/B MOLECULAR          Imaging Results    None          Medications   ibuprofen tablet 600 mg (600 mg Oral Given 10/24/23 0958)     Medical Decision Making  Amount and/or Complexity of Data Reviewed  Labs: ordered.    Risk  Prescription drug management.               ED Course as of 10/24/23 1111   Tue Oct 24, 2023   1108 Rapid COVID-19 test negative, influenza a and B negative.  Symptoms may be secondary to underlying viral syndrome versus urinary tract infection as evidenced by positive nitrites on urinalysis in the presence of many bacteria and 58 WBCs.  Will treat empirically for UTI, culture pending. [CB]      ED Course User Index  [CB] Mara Hamilton NP                    Clinical Impression:   Final diagnoses:  [J06.9] Upper respiratory tract infection, unspecified type (Primary)  [R11.10, R19.7] Vomiting and diarrhea  [N39.0] Urinary tract infection without hematuria, site unspecified        ED Disposition Condition    Discharge Stable          ED  Prescriptions       Medication Sig Dispense Start Date End Date Auth. Provider    nitrofurantoin, macrocrystal-monohydrate, (MACROBID) 100 MG capsule Take 1 capsule (100 mg total) by mouth 2 (two) times daily. for 7 days 14 capsule 10/24/2023 10/31/2023 Mara Hamilton NP    promethazine-dextromethorphan (PROMETHAZINE-DM) 6.25-15 mg/5 mL Syrp Take 5 mLs by mouth every 4 (four) hours as needed. 118 mL 10/24/2023 11/3/2023 Mara Hamilton NP          Follow-up Information       Follow up With Specialties Details Why Contact [facility] Psychology, Internal Medicine, Gynecology, Dental General Practice Schedule an appointment as soon as possible for a visit in 2 days for re-evaluation of today's complaint [address] [facility] [address]  [phone]               Mara Hamilton NP  10/24/23 1111

## 2023-10-24 NOTE — Clinical Note
"Anna "Anna" Collette was seen and treated in our emergency department on 10/24/2023.  She may return to work on 10/26/2023.       If you have any questions or concerns, please don't hesitate to call.      Mara Hamilton NP"

## 2023-10-25 LAB
BACTERIA UR CULT: NORMAL
BACTERIA UR CULT: NORMAL

## 2023-10-28 ENCOUNTER — HOSPITAL ENCOUNTER (EMERGENCY)
Facility: HOSPITAL | Age: 43
Discharge: HOME | End: 2023-10-28
Attending: EMERGENCY MEDICINE
Payer: MEDICAID

## 2023-10-28 VITALS
HEIGHT: 67 IN | OXYGEN SATURATION: 98 % | WEIGHT: 80 LBS | RESPIRATION RATE: 18 BRPM | TEMPERATURE: 99 F | SYSTOLIC BLOOD PRESSURE: 105 MMHG | BODY MASS INDEX: 12.56 KG/M2 | HEART RATE: 78 BPM | DIASTOLIC BLOOD PRESSURE: 85 MMHG

## 2023-10-28 DIAGNOSIS — F06.4 ANXIETY DISORDER DUE TO GENERAL MEDICAL CONDITION: Primary | ICD-10-CM

## 2023-10-28 DIAGNOSIS — R51.9 NONINTRACTABLE HEADACHE, UNSPECIFIED CHRONICITY PATTERN, UNSPECIFIED HEADACHE TYPE: ICD-10-CM

## 2023-10-28 PROCEDURE — 25000003 PHARM REV CODE 250: Performed by: EMERGENCY MEDICINE

## 2023-10-28 PROCEDURE — 99283 EMERGENCY DEPT VISIT LOW MDM: CPT

## 2023-10-28 RX ORDER — IBUPROFEN 800 MG/1
800 TABLET ORAL
Status: COMPLETED | OUTPATIENT
Start: 2023-10-28 | End: 2023-10-28

## 2023-10-28 RX ORDER — ONDANSETRON 4 MG/1
4 TABLET, ORALLY DISINTEGRATING ORAL EVERY 8 HOURS PRN
Qty: 12 TABLET | Refills: 0 | Status: SHIPPED | OUTPATIENT
Start: 2023-10-28 | End: 2023-11-16 | Stop reason: SDUPTHER

## 2023-10-28 RX ORDER — ACETAMINOPHEN 500 MG
1000 TABLET ORAL
Status: COMPLETED | OUTPATIENT
Start: 2023-10-28 | End: 2023-10-28

## 2023-10-28 RX ORDER — ONDANSETRON 4 MG/1
8 TABLET, ORALLY DISINTEGRATING ORAL
Status: COMPLETED | OUTPATIENT
Start: 2023-10-28 | End: 2023-10-28

## 2023-10-28 RX ADMIN — ONDANSETRON 8 MG: 4 TABLET, ORALLY DISINTEGRATING ORAL at 08:10

## 2023-10-28 RX ADMIN — IBUPROFEN 800 MG: 800 TABLET, FILM COATED ORAL at 08:10

## 2023-10-28 RX ADMIN — ACETAMINOPHEN 1000 MG: 500 TABLET ORAL at 08:10

## 2023-10-28 NOTE — ED PROVIDER NOTES
"Encounter Date: 10/28/2023       History     Chief Complaint   Patient presents with    Headache     Pt reports frontal headache that started last night. Report that she did not take anything for it.      43-year-old female with history of anxiety, chronic pain, headaches presents the emergency depart with a frontal headache that gradually began yesterday, she did not take anything for the pain, states "I would rather just come here".  Associated with 1 episode of nausea.  She is here often for various complaints.  No chest pain or shortness of breath.  Not ill appearing, afebrile      Review of patient's allergies indicates:  No Known Allergies  Past Medical History:   Diagnosis Date    Anxiety     Arrhythmia     "They say my heart skips a beat"    Depression      Past Surgical History:   Procedure Laterality Date     SECTION       Family History   Problem Relation Age of Onset    Cancer Mother      Social History     Tobacco Use    Smoking status: Every Day     Current packs/day: 1.00     Types: Cigarettes    Smokeless tobacco: Never   Substance Use Topics    Alcohol use: Not Currently    Drug use: Yes     Types: Marijuana     Review of Systems   Constitutional:  Negative for fever.   HENT:  Negative for sore throat.    Respiratory:  Negative for shortness of breath.    Cardiovascular:  Negative for chest pain.   Gastrointestinal:  Negative for nausea.   Genitourinary:  Negative for dysuria.   Musculoskeletal:  Negative for back pain.   Skin:  Negative for rash.   Neurological:  Positive for headaches. Negative for weakness.   Hematological:  Does not bruise/bleed easily.   All other systems reviewed and are negative.      Physical Exam     Initial Vitals [10/28/23 0835]   BP Pulse Resp Temp SpO2   105/85 78 18 98.6 °F (37 °C) 98 %      MAP       --         Physical Exam    Nursing note and vitals reviewed.  Constitutional: She appears well-developed and well-nourished. She is not diaphoretic. No distress. "   HENT:   Head: Normocephalic and atraumatic.   Mouth/Throat: Oropharynx is clear and moist.   Eyes: Conjunctivae and EOM are normal. Pupils are equal, round, and reactive to light.   Neck: Neck supple. No tracheal deviation present. No JVD present.   Normal range of motion.  Cardiovascular:  Normal rate, regular rhythm, normal heart sounds and intact distal pulses.           No murmur heard.  Pulmonary/Chest: Breath sounds normal. No stridor. No respiratory distress. She has no wheezes. She has no rhonchi. She has no rales. She exhibits no tenderness.   Abdominal: Abdomen is soft. Bowel sounds are normal. She exhibits no distension. There is no abdominal tenderness. There is no rebound and no guarding.   Musculoskeletal:         General: No tenderness or edema. Normal range of motion.      Cervical back: Normal range of motion and neck supple.     Neurological: She is alert and oriented to person, place, and time. She has normal strength. No cranial nerve deficit. GCS score is 15. GCS eye subscore is 4. GCS verbal subscore is 5. GCS motor subscore is 6.   Skin: Skin is warm and dry. Capillary refill takes less than 2 seconds.         ED Course   Procedures  Labs Reviewed - No data to display       Imaging Results    None          Medications   ondansetron disintegrating tablet 8 mg (has no administration in time range)   ibuprofen tablet 800 mg (has no administration in time range)   acetaminophen tablet 1,000 mg (has no administration in time range)     Medical Decision Making  Risk  OTC drugs.  Prescription drug management.                          Medical Decision Making:   Differential Diagnosis:   Chronic pain, headaches, anxiety      Clinical Impression:   Final diagnoses:  [R51.9] Nonintractable headache, unspecified chronicity pattern, unspecified headache type  [F06.4] Anxiety disorder due to general medical condition (Primary)        ED Disposition Condition    Discharge Stable          ED Prescriptions        Medication Sig Dispense Start Date End Date Auth. Provider    ondansetron (ZOFRAN-ODT) 4 MG TbDL Take 1 tablet (4 mg total) by mouth every 8 (eight) hours as needed (Nausea and vomiting). 12 tablet 10/28/2023 -- Simeon Palacios MD          Follow-up Information       Follow up With Specialties Details Why Contact Info    Primary care physician  In 2 days               Simeon Palacios MD  10/28/23 0838       Simeon Palacios MD  10/28/23 0824

## 2023-11-06 ENCOUNTER — HOSPITAL ENCOUNTER (OUTPATIENT)
Dept: RADIOLOGY | Facility: HOSPITAL | Age: 43
Discharge: HOME | End: 2023-11-06
Attending: NURSE PRACTITIONER
Payer: MEDICAID

## 2023-11-06 DIAGNOSIS — R22.1 LOCALIZED SWELLING, MASS AND LUMP, NECK: ICD-10-CM

## 2023-11-06 PROCEDURE — 76536 US EXAM OF HEAD AND NECK: CPT | Mod: TC

## 2023-11-15 ENCOUNTER — HOSPITAL ENCOUNTER (EMERGENCY)
Facility: HOSPITAL | Age: 43
Discharge: HOME | End: 2023-11-15
Attending: EMERGENCY MEDICINE
Payer: MEDICAID

## 2023-11-15 VITALS
RESPIRATION RATE: 18 BRPM | DIASTOLIC BLOOD PRESSURE: 86 MMHG | WEIGHT: 82.19 LBS | TEMPERATURE: 98 F | HEART RATE: 71 BPM | BODY MASS INDEX: 14.56 KG/M2 | HEIGHT: 63 IN | SYSTOLIC BLOOD PRESSURE: 117 MMHG | OXYGEN SATURATION: 100 %

## 2023-11-15 DIAGNOSIS — R11.0 NAUSEA: Primary | ICD-10-CM

## 2023-11-15 PROCEDURE — 63600175 PHARM REV CODE 636 W HCPCS: Performed by: EMERGENCY MEDICINE

## 2023-11-15 PROCEDURE — 96372 THER/PROPH/DIAG INJ SC/IM: CPT | Performed by: EMERGENCY MEDICINE

## 2023-11-15 PROCEDURE — 99284 EMERGENCY DEPT VISIT MOD MDM: CPT

## 2023-11-15 RX ORDER — ONDANSETRON 4 MG/1
4 TABLET, ORALLY DISINTEGRATING ORAL EVERY 8 HOURS PRN
Qty: 12 TABLET | Refills: 0 | Status: SHIPPED | OUTPATIENT
Start: 2023-11-15 | End: 2023-11-16 | Stop reason: SDUPTHER

## 2023-11-15 RX ORDER — ONDANSETRON 2 MG/ML
4 INJECTION INTRAMUSCULAR; INTRAVENOUS
Status: COMPLETED | OUTPATIENT
Start: 2023-11-15 | End: 2023-11-15

## 2023-11-15 RX ADMIN — ONDANSETRON 4 MG: 2 INJECTION INTRAMUSCULAR; INTRAVENOUS at 07:11

## 2023-11-15 NOTE — ED PROVIDER NOTES
"Encounter Date: 11/15/2023       History     Chief Complaint   Patient presents with    Headache     Headache, sore throat, and nausea since last night but has gotten worse.  My grandkids were here yesterday and diagnosed with RSV.     43-year-old female with a history of anxiety presents the emergency department with headache nausea sore throat since yesterday.  Did not take anything for it, "I just came straight here".  No fever.  Grandchildren recently diagnosed with RSV.  Denies any shortness of breath.  Oxygen saturations 100%.  Alert oriented x4, GCS is 15.  Patient states "I just need a shot for nausea"      Review of patient's allergies indicates:  No Known Allergies  Past Medical History:   Diagnosis Date    Anxiety     Arrhythmia     "They say my heart skips a beat"    Depression      Past Surgical History:   Procedure Laterality Date     SECTION       Family History   Problem Relation Age of Onset    Cancer Mother      Social History     Tobacco Use    Smoking status: Every Day     Current packs/day: 1.00     Types: Cigarettes    Smokeless tobacco: Never   Substance Use Topics    Alcohol use: Not Currently    Drug use: Yes     Types: Marijuana     Review of Systems   Constitutional:  Negative for fever.   HENT:  Positive for sore throat.    Respiratory:  Negative for shortness of breath.    Cardiovascular:  Negative for chest pain.   Gastrointestinal:  Positive for nausea.   Genitourinary:  Negative for dysuria.   Musculoskeletal:  Negative for back pain.   Skin:  Negative for rash.   Neurological:  Positive for headaches. Negative for weakness.   Hematological:  Does not bruise/bleed easily.   All other systems reviewed and are negative.      Physical Exam     Initial Vitals [11/15/23 0748]   BP Pulse Resp Temp SpO2   117/86 71 18 98 °F (36.7 °C) 100 %      MAP       --         Physical Exam    Nursing note and vitals reviewed.  Constitutional: She appears well-developed and well-nourished. She " is not diaphoretic. No distress.   HENT:   Head: Normocephalic and atraumatic.   Mouth/Throat: Oropharynx is clear and moist. No oropharyngeal exudate.   Eyes: Conjunctivae and EOM are normal. Pupils are equal, round, and reactive to light.   Neck: Neck supple. No tracheal deviation present. No JVD present.   Normal range of motion.  Cardiovascular:  Normal rate, regular rhythm, normal heart sounds and intact distal pulses.           No murmur heard.  Pulmonary/Chest: Breath sounds normal. No stridor. No respiratory distress. She has no wheezes. She has no rhonchi. She has no rales. She exhibits no tenderness.   Abdominal: Abdomen is soft. Bowel sounds are normal. She exhibits no distension. There is no abdominal tenderness. There is no rebound and no guarding.   Musculoskeletal:         General: No tenderness or edema. Normal range of motion.      Cervical back: Normal range of motion and neck supple.     Neurological: She is alert and oriented to person, place, and time. She has normal strength. No cranial nerve deficit. GCS score is 15. GCS eye subscore is 4. GCS verbal subscore is 5. GCS motor subscore is 6.   Skin: Skin is warm and dry. Capillary refill takes less than 2 seconds.         ED Course   Procedures  Labs Reviewed - No data to display       Imaging Results    None          Medications   ondansetron injection 4 mg (has no administration in time range)     Medical Decision Making  Risk  Prescription drug management.                          Medical Decision Making:   Differential Diagnosis:   Viral syndrome, nausea      Clinical Impression:   Final diagnoses:  [R11.0] Nausea (Primary)        ED Disposition Condition    Discharge Stable          ED Prescriptions       Medication Sig Dispense Start Date End Date Auth. Provider    ondansetron (ZOFRAN-ODT) 4 MG TbDL Take 1 tablet (4 mg total) by mouth every 8 (eight) hours as needed (Nausea and vomiting). 12 tablet 11/15/2023 -- Simeon Palacios MD           Follow-up Information       Follow up With Specialties Details Why Contact [facility] Psychology, Internal Medicine, Gynecology, Dental General Practice Schedule an appointment as soon as possible for a visit   [address] [facility] [address]  [phone]               Simeon Palacios MD  11/15/23 0759

## 2023-11-16 ENCOUNTER — HOSPITAL ENCOUNTER (EMERGENCY)
Facility: HOSPITAL | Age: 43
Discharge: HOME | End: 2023-11-16
Attending: EMERGENCY MEDICINE
Payer: MEDICAID

## 2023-11-16 VITALS
HEIGHT: 63 IN | HEART RATE: 67 BPM | SYSTOLIC BLOOD PRESSURE: 112 MMHG | WEIGHT: 82 LBS | BODY MASS INDEX: 14.53 KG/M2 | RESPIRATION RATE: 18 BRPM | OXYGEN SATURATION: 99 % | DIASTOLIC BLOOD PRESSURE: 75 MMHG | TEMPERATURE: 98 F

## 2023-11-16 DIAGNOSIS — R11.0 NAUSEA: Primary | ICD-10-CM

## 2023-11-16 PROCEDURE — 25000003 PHARM REV CODE 250

## 2023-11-16 PROCEDURE — 63600175 PHARM REV CODE 636 W HCPCS

## 2023-11-16 PROCEDURE — 99284 EMERGENCY DEPT VISIT MOD MDM: CPT

## 2023-11-16 PROCEDURE — 96372 THER/PROPH/DIAG INJ SC/IM: CPT

## 2023-11-16 RX ORDER — PROMETHAZINE HYDROCHLORIDE 25 MG/ML
25 INJECTION, SOLUTION INTRAMUSCULAR; INTRAVENOUS
Status: COMPLETED | OUTPATIENT
Start: 2023-11-16 | End: 2023-11-16

## 2023-11-16 RX ORDER — PROPARACAINE HYDROCHLORIDE 5 MG/ML
1 SOLUTION/ DROPS OPHTHALMIC
Status: COMPLETED | OUTPATIENT
Start: 2023-11-16 | End: 2023-11-16

## 2023-11-16 RX ORDER — ONDANSETRON 4 MG/1
4 TABLET, FILM COATED ORAL EVERY 6 HOURS PRN
Qty: 12 TABLET | Refills: 0 | Status: SHIPPED | OUTPATIENT
Start: 2023-11-16

## 2023-11-16 RX ADMIN — PROMETHAZINE HYDROCHLORIDE 25 MG: 25 INJECTION INTRAMUSCULAR; INTRAVENOUS at 10:11

## 2023-11-16 RX ADMIN — PROPARACAINE HYDROCHLORIDE 1 DROP: 5 SOLUTION/ DROPS OPHTHALMIC at 10:11

## 2023-11-16 RX ADMIN — FLUORESCEIN SODIUM 1 EACH: 1 STRIP OPHTHALMIC at 10:11

## 2023-11-16 NOTE — ED PROVIDER NOTES
"Encounter Date: 2023       History     Chief Complaint   Patient presents with    Nausea     Complains of pain/pressure to right eye (recurring) with nausea, dizziness, and ground level fall this morning.      43-year-old female with history of anxiety, depression presents to ED with complaints of right-sided eye pain described as throbbing that started this morning.  She reports symptoms have actually been ongoing for years and is scheduled to follow up with Ophthalmology.  She was associated photophobia and nausea symptoms.  No medications taken this morning.  No relieving factors.  No precipitating factors.  Symptoms are not new.    The history is provided by the patient.     Review of patient's allergies indicates:  No Known Allergies  Past Medical History:   Diagnosis Date    Anxiety     Arrhythmia     "They say my heart skips a beat"    Depression      Past Surgical History:   Procedure Laterality Date     SECTION       Family History   Problem Relation Age of Onset    Cancer Mother      Social History     Tobacco Use    Smoking status: Every Day     Current packs/day: 1.00     Types: Cigarettes    Smokeless tobacco: Never   Substance Use Topics    Alcohol use: Not Currently    Drug use: Yes     Types: Marijuana     Review of Systems   Constitutional:  Negative for fever.   HENT:  Negative for sore throat.    Eyes:  Positive for photophobia and pain. Negative for visual disturbance.   Respiratory:  Negative for shortness of breath.    Cardiovascular:  Negative for chest pain.   Gastrointestinal:  Positive for nausea.   Endocrine: Negative.    Genitourinary:  Negative for dysuria.   Musculoskeletal:  Negative for back pain.   Skin:  Negative for rash.   Allergic/Immunologic: Negative.    Neurological:  Negative for weakness.   Hematological:  Does not bruise/bleed easily.   Psychiatric/Behavioral: Negative.         Physical Exam     Initial Vitals [23 0957]   BP Pulse Resp Temp SpO2   113/77 " 65 16 98.2 °F (36.8 °C) 100 %      MAP       --         Physical Exam    Nursing note and vitals reviewed.  Constitutional: She appears well-developed and well-nourished.   HENT:   Head: Normocephalic and atraumatic.   Mouth/Throat: Uvula is midline, oropharynx is clear and moist and mucous membranes are normal. No trismus in the jaw. No uvula swelling.   Eyes: EOM are normal. Pupils are equal, round, and reactive to light. Right conjunctiva is not injected. Left conjunctiva is not injected.   Neck: Neck supple.   Normal range of motion.  Cardiovascular:  Normal rate and regular rhythm.           Pulmonary/Chest: Breath sounds normal. No respiratory distress. She has no wheezes.   Abdominal: She exhibits no distension.   Musculoskeletal:         General: Normal range of motion.      Cervical back: Normal range of motion and neck supple.     Neurological: She is alert and oriented to person, place, and time.   Skin: Skin is warm and dry.   Psychiatric: Thought content normal.         ED Course   Procedures  Labs Reviewed - No data to display       Imaging Results    None          Medications   promethazine injection 25 mg (25 mg Intramuscular Given 11/16/23 1023)   proparacaine 0.5 % ophthalmic solution 1 drop (1 drop Right Eye Given 11/16/23 1038)   fluorescein ophthalmic strip 1 each (1 each Right Eye Given 11/16/23 1038)     Medical Decision Making  DDx:  Nausea, migraine, glaucoma, foreign body eye, corneal abrasion    43-year-old female to ED for above complaints.  Denies any trauma.  Symptoms are not new.  IOP of right eye was 20, left 16.  No fluorescein uptake on exam.  Visual acuity was 20/100  for right eye and 20/50 for left.  PERRLA and EOMI were noted.  Patient was given Phenergan IM with good improvement in nausea symptoms.  Patient reports pain also improved.  Advised patient to follow up with Ophthalmology as scheduled.  Return precautions given.    Risk  Prescription drug management.                                    Clinical Impression:  Final diagnoses:  [R11.0] Nausea (Primary)          ED Disposition Condition    Discharge Stable          ED Prescriptions       Medication Sig Dispense Start Date End Date Auth. Provider    ondansetron (ZOFRAN) 4 MG tablet Take 1 tablet (4 mg total) by mouth every 6 (six) hours as needed for Nausea. 12 tablet 11/16/2023 -- Bartolo Rodriguez NP          Follow-up Information       Follow up With Specialties Details Why Contact Info    [facility] OPHTHALMOLOGY Ophthalmology   [facility] [facility] [address]    [facility]    [address] [facility]             Bartolo Rodriguez NP  11/16/23 7703

## 2023-11-16 NOTE — DISCHARGE INSTRUCTIONS
You take the Zofran every 6 hours as needed for nausea.  Take Tylenol and ibuprofen as needed for pain.  Follow up with Ophthalmology or Optometry.  You likely need eyeglasses.

## 2023-11-16 NOTE — Clinical Note
"Anna "Anna" Collette was seen and treated in our emergency department on 11/16/2023.  She may return to work on 11/18/2023.       If you have any questions or concerns, please don't hesitate to call.      Karissa MITTAL    "

## 2023-12-08 DIAGNOSIS — Z12.31 ENCOUNTER FOR SCREENING MAMMOGRAM FOR BREAST CANCER: Primary | ICD-10-CM

## 2024-01-02 DIAGNOSIS — R22.1 NECK MASS: Primary | ICD-10-CM

## 2024-04-17 ENCOUNTER — HOSPITAL ENCOUNTER (EMERGENCY)
Facility: HOSPITAL | Age: 44
Discharge: HOME | End: 2024-04-17
Attending: EMERGENCY MEDICINE

## 2024-04-17 VITALS
WEIGHT: 82 LBS | RESPIRATION RATE: 14 BRPM | TEMPERATURE: 98 F | OXYGEN SATURATION: 100 % | DIASTOLIC BLOOD PRESSURE: 93 MMHG | HEIGHT: 63 IN | BODY MASS INDEX: 14.53 KG/M2 | SYSTOLIC BLOOD PRESSURE: 161 MMHG | HEART RATE: 54 BPM

## 2024-04-17 DIAGNOSIS — F45.0 SOMATIZATION DISORDER: ICD-10-CM

## 2024-04-17 DIAGNOSIS — F06.4 ANXIETY DISORDER DUE TO GENERAL MEDICAL CONDITION: Primary | ICD-10-CM

## 2024-04-17 PROCEDURE — 25000003 PHARM REV CODE 250: Performed by: EMERGENCY MEDICINE

## 2024-04-17 PROCEDURE — 99283 EMERGENCY DEPT VISIT LOW MDM: CPT

## 2024-04-17 RX ORDER — ONDANSETRON 4 MG/1
8 TABLET, ORALLY DISINTEGRATING ORAL
Status: COMPLETED | OUTPATIENT
Start: 2024-04-17 | End: 2024-04-17

## 2024-04-17 RX ORDER — ONDANSETRON 4 MG/1
4 TABLET, ORALLY DISINTEGRATING ORAL EVERY 8 HOURS PRN
Qty: 12 TABLET | Refills: 0 | Status: SHIPPED | OUTPATIENT
Start: 2024-04-17

## 2024-04-17 RX ADMIN — ONDANSETRON 8 MG: 4 TABLET, ORALLY DISINTEGRATING ORAL at 10:04

## 2024-04-17 NOTE — ED PROVIDER NOTES
"Encounter Date: 2024       History     Chief Complaint   Patient presents with    Weakness     Patient reports generalized weakness and nausea/vomiting since waking up this morning. Denies diarrhea.      43-year-old female with a history of anxiety, here often for various complaints, work excuses etc. presents to the ER with nausea and vomiting.  No diarrhea or abdominal pain.  When I walked into the room she was sleeping, and pretending to gag while she was sleeping.  She is not ill appearing, vital signs are stable.  No signs of dehydration.  No fever.  Here often with same complaints      Review of patient's allergies indicates:  No Known Allergies  Past Medical History:   Diagnosis Date    Anxiety     Arrhythmia     "They say my heart skips a beat"    Depression      Past Surgical History:   Procedure Laterality Date     SECTION       Family History   Problem Relation Name Age of Onset    Cancer Mother       Social History     Tobacco Use    Smoking status: Every Day     Current packs/day: 1.00     Types: Cigarettes    Smokeless tobacco: Never   Substance Use Topics    Alcohol use: Not Currently    Drug use: Yes     Types: Marijuana     Review of Systems   Constitutional:  Negative for fever.   HENT:  Negative for sore throat.    Respiratory:  Negative for shortness of breath.    Cardiovascular:  Negative for chest pain.   Gastrointestinal:  Negative for nausea.   Genitourinary:  Negative for dysuria.   Musculoskeletal:  Negative for back pain.   Skin:  Negative for rash.   Neurological:  Negative for weakness.   Hematological:  Does not bruise/bleed easily.   All other systems reviewed and are negative.      Physical Exam     Initial Vitals [24 0948]   BP Pulse Resp Temp SpO2   (!) 161/93 (!) 54 14 97.6 °F (36.4 °C) 100 %      MAP       --         Physical Exam    Nursing note and vitals reviewed.  Constitutional: She appears well-developed and well-nourished. She is not diaphoretic. No " distress.   HENT:   Head: Normocephalic and atraumatic.   Eyes: Conjunctivae and EOM are normal. Pupils are equal, round, and reactive to light.   Neck: Neck supple.   Normal range of motion.  Cardiovascular:  Normal rate, regular rhythm, normal heart sounds and intact distal pulses.           No murmur heard.  Pulmonary/Chest: Breath sounds normal. No respiratory distress. She has no wheezes. She has no rhonchi. She has no rales. She exhibits no tenderness.   Abdominal: Abdomen is soft. Bowel sounds are normal.   Musculoskeletal:         General: No tenderness or edema. Normal range of motion.      Cervical back: Normal range of motion and neck supple.     Neurological: She is alert and oriented to person, place, and time. She has normal strength. No cranial nerve deficit. GCS score is 15. GCS eye subscore is 4. GCS verbal subscore is 5. GCS motor subscore is 6.   Skin: Skin is warm and dry. Capillary refill takes less than 2 seconds.         ED Course   Procedures  Labs Reviewed - No data to display       Imaging Results    None          Medications   ondansetron disintegrating tablet 8 mg (8 mg Oral Given 4/17/24 1030)     Medical Decision Making  Risk  Prescription drug management.                          Medical Decision Making:   Differential Diagnosis:   Anxiety, somatization disorder             Clinical Impression:  Final diagnoses:  [F06.4] Anxiety disorder due to general medical condition (Primary)  [F45.0] Somatization disorder          ED Disposition Condition    Discharge Stable          ED Prescriptions       Medication Sig Dispense Start Date End Date Auth. Provider    ondansetron (ZOFRAN-ODT) 4 MG TbDL Take 1 tablet (4 mg total) by mouth every 8 (eight) hours as needed (Nausea and vomiting). 12 tablet 4/17/2024 -- Simeon Palacios MD          Follow-up Information    None          Simeon Palacios MD  04/17/24 1024       Simeon Palacios MD  04/17/24 1133

## 2024-04-17 NOTE — ED NOTES
Patient found seated on floor by registration desk upon checking in. Patient states she sat down because she just feels so weak. Assisted patient into a wheel chair and roomed in ED 10.

## 2024-04-22 ENCOUNTER — HOSPITAL ENCOUNTER (EMERGENCY)
Facility: HOSPITAL | Age: 44
Discharge: HOME | End: 2024-04-22
Attending: EMERGENCY MEDICINE

## 2024-04-22 VITALS
RESPIRATION RATE: 16 BRPM | HEART RATE: 67 BPM | TEMPERATURE: 98 F | BODY MASS INDEX: 14.53 KG/M2 | DIASTOLIC BLOOD PRESSURE: 109 MMHG | HEIGHT: 63 IN | SYSTOLIC BLOOD PRESSURE: 137 MMHG | OXYGEN SATURATION: 98 % | WEIGHT: 82 LBS

## 2024-04-22 DIAGNOSIS — R11.2 NAUSEA AND VOMITING, UNSPECIFIED VOMITING TYPE: ICD-10-CM

## 2024-04-22 DIAGNOSIS — S16.1XXA ACUTE STRAIN OF NECK MUSCLE, INITIAL ENCOUNTER: Primary | ICD-10-CM

## 2024-04-22 PROCEDURE — 63600175 PHARM REV CODE 636 W HCPCS

## 2024-04-22 PROCEDURE — 99284 EMERGENCY DEPT VISIT MOD MDM: CPT | Mod: 25

## 2024-04-22 PROCEDURE — 96372 THER/PROPH/DIAG INJ SC/IM: CPT

## 2024-04-22 PROCEDURE — 25000003 PHARM REV CODE 250

## 2024-04-22 RX ORDER — PROMETHAZINE HYDROCHLORIDE 25 MG/1
25 TABLET ORAL EVERY 6 HOURS PRN
Qty: 15 TABLET | Refills: 0 | Status: SHIPPED | OUTPATIENT
Start: 2024-04-22

## 2024-04-22 RX ORDER — CYCLOBENZAPRINE HCL 10 MG
10 TABLET ORAL 3 TIMES DAILY PRN
Qty: 15 TABLET | Refills: 0 | Status: SHIPPED | OUTPATIENT
Start: 2024-04-22 | End: 2024-04-27

## 2024-04-22 RX ORDER — CELECOXIB 200 MG/1
200 CAPSULE ORAL 2 TIMES DAILY PRN
Qty: 10 CAPSULE | Refills: 0 | Status: SHIPPED | OUTPATIENT
Start: 2024-04-22

## 2024-04-22 RX ORDER — ONDANSETRON 4 MG/1
4 TABLET, ORALLY DISINTEGRATING ORAL
Status: COMPLETED | OUTPATIENT
Start: 2024-04-22 | End: 2024-04-22

## 2024-04-22 RX ORDER — HYDROCODONE BITARTRATE AND ACETAMINOPHEN 5; 325 MG/1; MG/1
1 TABLET ORAL
Status: COMPLETED | OUTPATIENT
Start: 2024-04-22 | End: 2024-04-22

## 2024-04-22 RX ORDER — KETOROLAC TROMETHAMINE 30 MG/ML
30 INJECTION, SOLUTION INTRAMUSCULAR; INTRAVENOUS
Status: COMPLETED | OUTPATIENT
Start: 2024-04-22 | End: 2024-04-22

## 2024-04-22 RX ADMIN — KETOROLAC TROMETHAMINE 30 MG: 30 INJECTION, SOLUTION INTRAMUSCULAR; INTRAVENOUS at 12:04

## 2024-04-22 RX ADMIN — HYDROCODONE BITARTRATE AND ACETAMINOPHEN 1 TABLET: 5; 325 TABLET ORAL at 12:04

## 2024-04-22 RX ADMIN — ONDANSETRON 4 MG: 4 TABLET, ORALLY DISINTEGRATING ORAL at 12:04

## 2024-04-22 NOTE — ED PROVIDER NOTES
"Encounter Date: 2024       History     Chief Complaint   Patient presents with    Weakness     Complains of generalized weakness, nausea and vomiting, and neck pain x 2 days     This note is dictated on M*Modal word recognition program.  There are word recognition mistakes and grammatical errors that are occasionally missed on review.     Anna Hebert Collette is a 43 y.o. female presents to ER today with complaints of right-sided neck pain.  Patient reports she slept on her daughter's couch last night and feels like she exacerbated her neck pain.  Patient also reports chronic nausea and vomiting.  Patient requests something for her neck pain.  Patient rates neck pain 8/10.  Vital signs hemodynamically stable in triage.  Patient afebrile.  Patient denies any cough and nasal congestion.    The history is provided by the patient.     Review of patient's allergies indicates:  No Known Allergies  Past Medical History:   Diagnosis Date    Anxiety     Arrhythmia     "They say my heart skips a beat"    Depression     Migraine headache      Past Surgical History:   Procedure Laterality Date     SECTION       Family History   Problem Relation Name Age of Onset    Cancer Mother       Social History     Tobacco Use    Smoking status: Every Day     Current packs/day: 1.00     Types: Cigarettes    Smokeless tobacco: Never   Substance Use Topics    Alcohol use: Not Currently    Drug use: Yes     Types: Marijuana     Review of Systems   Gastrointestinal:  Positive for nausea and vomiting.   Musculoskeletal:  Positive for neck pain.   All other systems reviewed and are negative.      Physical Exam     Initial Vitals [24 1212]   BP Pulse Resp Temp SpO2   (!) 137/109 67 18 98.3 °F (36.8 °C) 98 %      MAP       --         Physical Exam    Constitutional: She is not diaphoretic. No distress.   HENT:   Right Ear: External ear normal.   Eyes: Pupils are equal, round, and reactive to light. Right eye exhibits no " discharge. Left eye exhibits no discharge.   Neck: Neck supple. No thyromegaly present. No tracheal deviation present. No JVD present.   Has some tenderness to sternocleidomastoid muscle of neck.  No obvious abscess, deformity of neck.  No enlarged lymph nodes.   Normal range of motion.  Cardiovascular:  Normal rate and regular rhythm.           Pulmonary/Chest: Breath sounds normal. No stridor. No respiratory distress. She has no wheezes. She has no rhonchi. She has no rales.   Abdominal: Abdomen is soft.   Musculoskeletal:         General: No tenderness or edema.      Cervical back: Normal range of motion and neck supple.     Lymphadenopathy:     She has no cervical adenopathy.   Neurological: She is alert and oriented to person, place, and time. GCS score is 15. GCS eye subscore is 4. GCS verbal subscore is 5. GCS motor subscore is 6.   Skin: Capillary refill takes less than 2 seconds. No rash noted. No erythema. No pallor.   Psychiatric: She has a normal mood and affect. Her behavior is normal. Thought content normal.         ED Course   Procedures  Labs Reviewed - No data to display       Imaging Results    None          Medications   ondansetron disintegrating tablet 4 mg (has no administration in time range)   ketorolac injection 30 mg (has no administration in time range)   HYDROcodone-acetaminophen 5-325 mg per tablet 1 tablet (has no administration in time range)     Medical Decision Making  Differential diagnosis include torticollis, cervical strain, musculoskeletal pain, muscle spasms, nausea vomiting, anxiety    Patient's symptoms are consistent with cervical muscle strain and chronic nausea vomiting.    Patient's pain was treated in ER.  Will place patient on Flexeril, Phenergan, Celebrex to help control nausea vomiting and cervical strain.  Vital signs hemodynamically stable throughout ER visit.  Patient does not appear to be toxic.  Patient stable at time of discharge in no acute distress.  No  life-threatening illnesses were found during ER visit today.  Patient was instructed to follow-up with PCP or other recommended specialist within the next 48-72 hours.  Patient was instructed to return to ER immediately for any worsening or concerning symptoms.  All discharge instructions discussed with patient, and patient agrees to comply with discharge instructions given today.     Amount and/or Complexity of Data Reviewed  Labs: ordered.    Risk  Prescription drug management.                                      Clinical Impression:  Final diagnoses:  [S16.1XXA] Acute strain of neck muscle, initial encounter (Primary)  [R11.2] Nausea and vomiting, unspecified vomiting type          ED Disposition Condition    Discharge Stable          ED Prescriptions       Medication Sig Dispense Start Date End Date Auth. Provider    cyclobenzaprine (FLEXERIL) 10 MG tablet Take 1 tablet (10 mg total) by mouth 3 (three) times daily as needed for Muscle spasms. 15 tablet 4/22/2024 4/27/2024 Umer York NP    celecoxib (CELEBREX) 200 MG capsule Take 1 capsule (200 mg total) by mouth 2 (two) times daily as needed for Pain. 10 capsule 4/22/2024 -- Umer York NP    promethazine (PHENERGAN) 25 MG tablet Take 1 tablet (25 mg total) by mouth every 6 (six) hours as needed for Nausea. 15 tablet 4/22/2024 -- Umer York NP          Follow-up Information       Follow up With Specialties Details Why Contact [facility], Internal Medicine, Gynecology, Dental General Practice In 2 days  [address] [facility] [address]  [phone]               Umer York NP  04/22/24 1545

## 2024-04-23 ENCOUNTER — HOSPITAL ENCOUNTER (EMERGENCY)
Facility: HOSPITAL | Age: 44
Discharge: HOME | End: 2024-04-23
Attending: EMERGENCY MEDICINE

## 2024-04-23 VITALS
WEIGHT: 82 LBS | TEMPERATURE: 98 F | SYSTOLIC BLOOD PRESSURE: 140 MMHG | HEIGHT: 63 IN | HEART RATE: 59 BPM | RESPIRATION RATE: 14 BRPM | DIASTOLIC BLOOD PRESSURE: 98 MMHG | BODY MASS INDEX: 14.53 KG/M2 | OXYGEN SATURATION: 98 %

## 2024-04-23 DIAGNOSIS — F45.0 SOMATIZATION DISORDER: Primary | ICD-10-CM

## 2024-04-23 PROCEDURE — 99281 EMR DPT VST MAYX REQ PHY/QHP: CPT

## 2024-04-23 NOTE — ED PROVIDER NOTES
"Encounter Date: 2024       History     Chief Complaint   Patient presents with    Abdominal Pain     Pt reports vomiting and having eye pain. Pt reports the vomiting started earlier today.     43-year-old female here multiple times for various complaints, complaining of nausea vomiting, here for this multiple times every week, wants work excuse is mostly.  No fever.  No other issues      Review of patient's allergies indicates:  No Known Allergies  Past Medical History:   Diagnosis Date    Anxiety     Arrhythmia     "They say my heart skips a beat"    Depression     Migraine headache      Past Surgical History:   Procedure Laterality Date     SECTION       Family History   Problem Relation Name Age of Onset    Cancer Mother       Social History     Tobacco Use    Smoking status: Every Day     Current packs/day: 1.00     Types: Cigarettes    Smokeless tobacco: Never   Substance Use Topics    Alcohol use: Not Currently    Drug use: Yes     Types: Marijuana     Review of Systems   Constitutional:  Negative for fever.   HENT:  Negative for sore throat.    Respiratory:  Negative for shortness of breath.    Cardiovascular:  Negative for chest pain.   Gastrointestinal:  Negative for nausea.   Genitourinary:  Negative for dysuria.   Musculoskeletal:  Negative for back pain.   Skin:  Negative for rash.   Neurological:  Negative for weakness.   Hematological:  Does not bruise/bleed easily.   All other systems reviewed and are negative.      Physical Exam     Initial Vitals [24 1841]   BP Pulse Resp Temp SpO2   (!) 140/98 (!) 59 14 97.9 °F (36.6 °C) 98 %      MAP       --         Physical Exam    Nursing note and vitals reviewed.  Constitutional: She appears well-developed and well-nourished. She is not diaphoretic. No distress.   HENT:   Head: Normocephalic and atraumatic.   Eyes: Conjunctivae and EOM are normal. Pupils are equal, round, and reactive to light.   Neck: Neck supple.   Normal range of " motion.  Cardiovascular:  Normal rate, regular rhythm, normal heart sounds and intact distal pulses.           No murmur heard.  Pulmonary/Chest: Breath sounds normal. No respiratory distress. She has no wheezes. She has no rhonchi. She has no rales. She exhibits no tenderness.   Abdominal: Abdomen is soft. Bowel sounds are normal.   Musculoskeletal:         General: No tenderness or edema. Normal range of motion.      Cervical back: Normal range of motion and neck supple.     Neurological: She is alert and oriented to person, place, and time. She has normal strength. No cranial nerve deficit. GCS score is 15. GCS eye subscore is 4. GCS verbal subscore is 5. GCS motor subscore is 6.   Skin: Skin is warm and dry. Capillary refill takes less than 2 seconds.         ED Course   Procedures  Labs Reviewed - No data to display       Imaging Results    None          Medications - No data to display  Medical Decision Making                                    Clinical Impression:  Final diagnoses:  [F45.0] Somatization disorder (Primary)          ED Disposition Condition    Discharge Stable          ED Prescriptions    None       Follow-up Information       Follow up With Specialties Details Why Contact Info    Primary care physician  In 2 days               Simeon Palacios MD  04/23/24 3813

## 2024-07-31 NOTE — DISCHARGE INSTRUCTIONS
Be sure to complete all antibiotics.  Drink plenty of fluids and empty your bladder frequently.  Alternate Tylenol and Motrin every 3 hours as directed for pain/fever.  See your doctor in 1-2 days and return to the emergency room for worsening condition.   No abnormal movements No abnormal movements No abnormal movements No abnormal movements No abnormal movements No abnormal movements No abnormal movements

## 2024-08-06 ENCOUNTER — HOSPITAL ENCOUNTER (EMERGENCY)
Facility: HOSPITAL | Age: 44
Discharge: HOME | End: 2024-08-06
Attending: EMERGENCY MEDICINE

## 2024-08-06 VITALS
OXYGEN SATURATION: 97 % | DIASTOLIC BLOOD PRESSURE: 87 MMHG | SYSTOLIC BLOOD PRESSURE: 114 MMHG | BODY MASS INDEX: 15.24 KG/M2 | RESPIRATION RATE: 18 BRPM | HEIGHT: 63 IN | TEMPERATURE: 97 F | WEIGHT: 86 LBS | HEART RATE: 84 BPM

## 2024-08-06 DIAGNOSIS — A08.4 VIRAL GASTROENTERITIS: Primary | ICD-10-CM

## 2024-08-06 LAB
ALBUMIN SERPL BCP-MCNC: 3.3 G/DL (ref 3.5–5.2)
ALP SERPL-CCNC: 100 U/L (ref 55–135)
ALT SERPL W/O P-5'-P-CCNC: 38 U/L (ref 10–44)
ANION GAP SERPL CALC-SCNC: 9 MMOL/L (ref 3–11)
AST SERPL-CCNC: 21 U/L (ref 10–40)
BASOPHILS # BLD AUTO: 0.08 K/UL (ref 0–0.2)
BASOPHILS NFR BLD: 0.8 % (ref 0–1.9)
BILIRUB SERPL-MCNC: 0.3 MG/DL (ref 0.1–1)
BUN SERPL-MCNC: 8 MG/DL (ref 6–20)
CALCIUM SERPL-MCNC: 8.4 MG/DL (ref 8.7–10.5)
CHLORIDE SERPL-SCNC: 106 MMOL/L (ref 95–110)
CO2 SERPL-SCNC: 27 MMOL/L (ref 23–29)
CREAT SERPL-MCNC: 0.7 MG/DL (ref 0.5–1.4)
DIFFERENTIAL METHOD BLD: ABNORMAL
EOSINOPHIL # BLD AUTO: 0.2 K/UL (ref 0–0.5)
EOSINOPHIL NFR BLD: 2 % (ref 0–8)
ERYTHROCYTE [DISTWIDTH] IN BLOOD BY AUTOMATED COUNT: 16 % (ref 11.5–14.5)
EST. GFR  (NO RACE VARIABLE): >60 ML/MIN/1.73 M^2
GLUCOSE SERPL-MCNC: 110 MG/DL (ref 70–110)
HCT VFR BLD AUTO: 43.5 % (ref 37–48.5)
HGB BLD-MCNC: 14.6 G/DL (ref 12–16)
IMM GRANULOCYTES # BLD AUTO: 0.03 K/UL (ref 0–0.04)
IMM GRANULOCYTES NFR BLD AUTO: 0.3 % (ref 0–0.5)
LYMPHOCYTES # BLD AUTO: 2.5 K/UL (ref 1–4.8)
LYMPHOCYTES NFR BLD: 24.1 % (ref 18–48)
MCH RBC QN AUTO: 29.7 PG (ref 27–31)
MCHC RBC AUTO-ENTMCNC: 33.6 G/DL (ref 32–36)
MCV RBC AUTO: 88 FL (ref 82–98)
MONOCYTES # BLD AUTO: 0.7 K/UL (ref 0.3–1)
MONOCYTES NFR BLD: 6.4 % (ref 4–15)
NEUTROPHILS # BLD AUTO: 6.8 K/UL (ref 1.8–7.7)
NEUTROPHILS NFR BLD: 66.4 % (ref 38–73)
NRBC BLD-RTO: 0 /100 WBC
PLATELET # BLD AUTO: 292 K/UL (ref 150–450)
PMV BLD AUTO: 12 FL (ref 9.2–12.9)
POTASSIUM SERPL-SCNC: 4.1 MMOL/L (ref 3.5–5.1)
PROT SERPL-MCNC: 6.7 G/DL (ref 6–8.4)
RBC # BLD AUTO: 4.92 M/UL (ref 4–5.4)
SODIUM SERPL-SCNC: 142 MMOL/L (ref 136–145)
WBC # BLD AUTO: 10.19 K/UL (ref 3.9–12.7)

## 2024-08-06 PROCEDURE — 25000003 PHARM REV CODE 250: Performed by: NURSE PRACTITIONER

## 2024-08-06 PROCEDURE — 36415 COLL VENOUS BLD VENIPUNCTURE: CPT | Performed by: NURSE PRACTITIONER

## 2024-08-06 PROCEDURE — 63600175 PHARM REV CODE 636 W HCPCS: Performed by: NURSE PRACTITIONER

## 2024-08-06 PROCEDURE — 85025 COMPLETE CBC W/AUTO DIFF WBC: CPT | Performed by: NURSE PRACTITIONER

## 2024-08-06 PROCEDURE — 80053 COMPREHEN METABOLIC PANEL: CPT | Performed by: NURSE PRACTITIONER

## 2024-08-06 PROCEDURE — 96375 TX/PRO/DX INJ NEW DRUG ADDON: CPT

## 2024-08-06 PROCEDURE — 96361 HYDRATE IV INFUSION ADD-ON: CPT

## 2024-08-06 PROCEDURE — 96365 THER/PROPH/DIAG IV INF INIT: CPT

## 2024-08-06 PROCEDURE — 99284 EMERGENCY DEPT VISIT MOD MDM: CPT | Mod: 25

## 2024-08-06 RX ORDER — ONDANSETRON HYDROCHLORIDE 2 MG/ML
8 INJECTION, SOLUTION INTRAVENOUS
Status: COMPLETED | OUTPATIENT
Start: 2024-08-06 | End: 2024-08-06

## 2024-08-06 RX ORDER — DIPHENHYDRAMINE HYDROCHLORIDE 50 MG/ML
25 INJECTION INTRAMUSCULAR; INTRAVENOUS
Status: COMPLETED | OUTPATIENT
Start: 2024-08-06 | End: 2024-08-06

## 2024-08-06 RX ORDER — ONDANSETRON 4 MG/1
4 TABLET, ORALLY DISINTEGRATING ORAL EVERY 8 HOURS PRN
Qty: 12 TABLET | Refills: 0 | Status: SHIPPED | OUTPATIENT
Start: 2024-08-06

## 2024-08-06 RX ADMIN — DIPHENHYDRAMINE HYDROCHLORIDE 25 MG: 50 INJECTION INTRAMUSCULAR; INTRAVENOUS at 04:08

## 2024-08-06 RX ADMIN — ONDANSETRON 8 MG: 2 INJECTION INTRAMUSCULAR; INTRAVENOUS at 03:08

## 2024-08-06 RX ADMIN — PROMETHAZINE HYDROCHLORIDE 12.5 MG: 25 INJECTION INTRAMUSCULAR; INTRAVENOUS at 04:08

## 2024-08-06 RX ADMIN — SODIUM CHLORIDE 1000 ML: 9 INJECTION, SOLUTION INTRAVENOUS at 03:08

## 2024-08-06 NOTE — ED PROVIDER NOTES
"Encounter Date: 2024       History     Chief Complaint   Patient presents with    Vomiting     Pt stated that she has been experiencing nausea / vomiting / cough since [facility] today.      The history is provided by the patient.   Emesis   This is a new problem. The current episode started today. The problem occurs 2 - 4 times per day. The problem has been unchanged. The emesis has an appearance of stomach contents.     Review of patient's allergies indicates:  No Known Allergies  Past Medical History:   Diagnosis Date    Anxiety     Arrhythmia     "They say my heart skips a beat"    Depression     Migraine headache      Past Surgical History:   Procedure Laterality Date     SECTION       Family History   Problem Relation Name Age of Onset    Cancer Mother       Social History     Tobacco Use    Smoking status: Every Day     Current packs/day: 1.00     Types: Cigarettes    Smokeless tobacco: Never   Substance Use Topics    Alcohol use: Not Currently    Drug use: Yes     Types: Marijuana     Review of Systems   Constitutional:  Positive for appetite change and fatigue.   Gastrointestinal:  Positive for nausea and vomiting.   All other systems reviewed and are negative.      Physical Exam     Initial Vitals [24 1412]   BP Pulse Resp Temp SpO2   (!) 147/92 (!) 59 18 97.1 °F (36.2 °C) 97 %      MAP       --         Physical Exam    Nursing note and vitals reviewed.  Constitutional: She appears well-developed and well-nourished. She is cooperative. She appears ill (actively with nausea).   HENT:   Head: Normocephalic and atraumatic.   Right Ear: External ear normal.   Left Ear: External ear normal.   Nose: Nose normal.   Mouth/Throat: Oropharynx is clear and moist.   Eyes: Conjunctivae are normal.   Neck: Neck supple.   Normal range of motion.  Cardiovascular:  Regular rhythm, normal heart sounds and intact distal pulses.           Pulmonary/Chest: Breath sounds normal.   Abdominal: Abdomen is soft. Bowel " sounds are normal.   Musculoskeletal:         General: Normal range of motion.      Cervical back: Normal range of motion and neck supple.     Neurological: She is alert and oriented to person, place, and time. She has normal strength. GCS score is 15. GCS eye subscore is 4. GCS verbal subscore is 5. GCS motor subscore is 6.   Skin: Skin is warm and dry. Capillary refill takes less than 2 seconds.   Psychiatric: She has a normal mood and affect. Her behavior is normal. Judgment and thought content normal.         ED Course   Procedures  Labs Reviewed   CBC W/ AUTO DIFFERENTIAL - Abnormal       Result Value    WBC 10.19      RBC 4.92      Hemoglobin 14.6      Hematocrit 43.5      MCV 88      MCH 29.7      MCHC 33.6      RDW 16.0 (*)     Platelets 292      MPV 12.0      Immature Granulocytes 0.3      Gran # (ANC) 6.8      Immature Grans (Abs) 0.03      Lymph # 2.5      Mono # 0.7      Eos # 0.2      Baso # 0.08      nRBC 0      Gran % 66.4      Lymph % 24.1      Mono % 6.4      Eosinophil % 2.0      Basophil % 0.8      Differential Method Automated     COMPREHENSIVE METABOLIC PANEL - Abnormal    Sodium 142      Potassium 4.1      Chloride 106      CO2 27      Glucose 110      BUN 8      Creatinine 0.7      Calcium 8.4 (*)     Total Protein 6.7      Albumin 3.3 (*)     Total Bilirubin 0.3      Alkaline Phosphatase 100      AST 21      ALT 38      eGFR >60.0      Anion Gap 9      Narrative:     Recoll. [id number] by SUMAN at 08/06/2024 15:17, reason: Specimen   hemolyzed          Imaging Results    None          Medications   sodium chloride 0.9% bolus 1,000 mL 1,000 mL (0 mLs Intravenous Stopped 8/6/24 1621)   ondansetron injection 8 mg (8 mg Intravenous Given 8/6/24 1500)   promethazine (PHENERGAN) 12.5 mg in 0.9% NaCl 50 mL IVPB (0 mg Intravenous Stopped 8/6/24 1650)   diphenhydrAMINE injection 25 mg (25 mg Intravenous Given 8/6/24 1630)     Medical Decision Making  N/V since this am. Feels dehydrated    Differential  Dx: Viral syndrome, Dehydration, Emesis    After IV fluids and Zofran still with some nausea on re-eval. Will add Phenergan and Benadryl. Still no abdominal pain. Will continue to monitor.     Feeling better after further medications. Ready to go home     Amount and/or Complexity of Data Reviewed  Labs: ordered.  Discussion of management or test interpretation with external provider(s): Labs ok. Symptoms improved after medications. Discussed viral illness and bland diet. Recheck with PCP or return to ER for new or worsening issues. Ambulatory from ER in no distress with family.     Risk  Prescription drug management.                                      Clinical Impression:  Final diagnoses:  [A08.4] Viral gastroenteritis (Primary)          ED Disposition Condition    Discharge Stable          ED Prescriptions       Medication Sig Dispense Start Date End Date Auth. Provider    ondansetron (ZOFRAN-ODT) 4 MG TbDL Take 1 tablet (4 mg total) by mouth every 8 (eight) hours as needed (Nausea and vomiting). 12 tablet 8/6/2024 -- Eduardo Arevalo NP          Follow-up Information       Follow up With Specialties Details Why Contact [facility], Internal Medicine, Gynecology, Dental General Practice In 2 days As needed, If symptoms worsen [address] [facility] [place] [address]  [phone]               Eduardo Arevalo NP  08/06/24 8702